# Patient Record
Sex: FEMALE | Race: WHITE | Employment: UNEMPLOYED | URBAN - METROPOLITAN AREA
[De-identification: names, ages, dates, MRNs, and addresses within clinical notes are randomized per-mention and may not be internally consistent; named-entity substitution may affect disease eponyms.]

---

## 2017-01-30 ENCOUNTER — OFFICE VISIT (OUTPATIENT)
Dept: OBGYN | Age: 57
End: 2017-01-30

## 2017-01-30 VITALS — WEIGHT: 135 LBS | DIASTOLIC BLOOD PRESSURE: 74 MMHG | SYSTOLIC BLOOD PRESSURE: 122 MMHG | BODY MASS INDEX: 23.91 KG/M2

## 2017-01-30 DIAGNOSIS — Z12.31 SCREENING MAMMOGRAM, ENCOUNTER FOR: ICD-10-CM

## 2017-01-30 DIAGNOSIS — Z01.419 WELL WOMAN EXAM WITH ROUTINE GYNECOLOGICAL EXAM: Primary | ICD-10-CM

## 2017-01-30 DIAGNOSIS — Z11.51 SPECIAL SCREENING EXAMINATION FOR HUMAN PAPILLOMAVIRUS (HPV): ICD-10-CM

## 2017-01-30 LAB — PAP SMEAR: NORMAL

## 2017-01-30 PROCEDURE — 99396 PREV VISIT EST AGE 40-64: CPT | Performed by: NURSE PRACTITIONER

## 2017-02-09 DIAGNOSIS — Z11.51 SPECIAL SCREENING EXAMINATION FOR HUMAN PAPILLOMAVIRUS (HPV): ICD-10-CM

## 2017-02-09 DIAGNOSIS — Z01.419 WELL WOMAN EXAM WITH ROUTINE GYNECOLOGICAL EXAM: ICD-10-CM

## 2017-12-05 ENCOUNTER — TELEPHONE (OUTPATIENT)
Dept: OBGYN | Age: 57
End: 2017-12-05

## 2017-12-05 NOTE — TELEPHONE ENCOUNTER
(ACTIVE) Mammogram order in pt chart, dated 1.30.2017. EXP: 3.30.2018.  Please have pt call [phone]  To schedule mammogram.

## 2017-12-13 ENCOUNTER — HOSPITAL ENCOUNTER (OUTPATIENT)
Dept: WOMENS IMAGING | Age: 57
Discharge: HOME | End: 2017-12-13
Payer: COMMERCIAL

## 2017-12-13 DIAGNOSIS — Z12.31 ENCOUNTER FOR SCREENING MAMMOGRAM FOR BREAST CANCER: ICD-10-CM

## 2017-12-13 PROCEDURE — G0202 SCR MAMMO BI INCL CAD: HCPCS

## 2017-12-19 ENCOUNTER — OFFICE VISIT (OUTPATIENT)
Dept: OBGYN | Age: 57
End: 2017-12-19

## 2017-12-19 VITALS
BODY MASS INDEX: 24.84 KG/M2 | WEIGHT: 140.2 LBS | HEIGHT: 63 IN | SYSTOLIC BLOOD PRESSURE: 110 MMHG | DIASTOLIC BLOOD PRESSURE: 70 MMHG

## 2017-12-19 DIAGNOSIS — N89.8 VAGINAL CYST: Primary | ICD-10-CM

## 2017-12-19 DIAGNOSIS — L72.0 INCLUSION CYST: ICD-10-CM

## 2017-12-19 PROCEDURE — 10060 I&D ABSCESS SIMPLE/SINGLE: CPT | Performed by: OBSTETRICS & GYNECOLOGY

## 2017-12-19 PROCEDURE — G8420 CALC BMI NORM PARAMETERS: HCPCS | Performed by: OBSTETRICS & GYNECOLOGY

## 2017-12-19 PROCEDURE — 1036F TOBACCO NON-USER: CPT | Performed by: OBSTETRICS & GYNECOLOGY

## 2017-12-19 PROCEDURE — 3017F COLORECTAL CA SCREEN DOC REV: CPT | Performed by: OBSTETRICS & GYNECOLOGY

## 2017-12-19 PROCEDURE — 3014F SCREEN MAMMO DOC REV: CPT | Performed by: OBSTETRICS & GYNECOLOGY

## 2017-12-19 PROCEDURE — G8484 FLU IMMUNIZE NO ADMIN: HCPCS | Performed by: OBSTETRICS & GYNECOLOGY

## 2017-12-19 PROCEDURE — 99213 OFFICE O/P EST LOW 20 MIN: CPT | Performed by: OBSTETRICS & GYNECOLOGY

## 2017-12-19 PROCEDURE — G8427 DOCREV CUR MEDS BY ELIG CLIN: HCPCS | Performed by: OBSTETRICS & GYNECOLOGY

## 2017-12-19 RX ORDER — ESTRADIOL 1 MG/1
TABLET ORAL
Refills: 1 | COMMUNITY
Start: 2017-12-08 | End: 2021-07-29 | Stop reason: SDUPTHER

## 2017-12-19 NOTE — PROGRESS NOTES
SUBJECTIVE:   59 y.o..  female complains of painful small cysts in the vaginal area, pt has had these cysts in the past. She states she has them on both sides of her vagina. Review of Systems:  General ROS: negative  Psychological ROS: negative  ENT ROS: negative  Endocrine ROS: negative  Respiratory ROS: no cough, shortness of breath, or wheezing  Cardiovascular ROS: no chest pain or dyspnea on exertion  Gastrointestinal ROS: no abdominal pain, change in bowel habits, or black or bloody stools  Genito-Urinary ROS: no dysuria, trouble voiding, or hematuria  Musculoskeletal ROS: negative  Neurological ROS: no TIA or stroke symptoms  Dermatological ROS: negative      OBJECTIVE:   /70   Ht 5' 3\" (1.6 m)   Wt 140 lb 3.2 oz (63.6 kg)   BMI 24.84 kg/m²     Past Medical History:   Diagnosis Date    Cancer (HCC)     Chronic back pain     Fibromyalgia     Headache(784.0)     Hypothyroid     Hypothyroidism     Melanoma ([facility] [address]     Thyroid disease                                                                    Past Surgical History:   Procedure Laterality Date    APPENDECTOMY      CARPAL TUNNEL RELEASE      FOOT SURGERY      x3    MOHS SURGERY Right      Family History   Problem Relation Age of Onset    Lung Cancer Father     Cervical Cancer Mother     Cancer Father      Social History     Social History    Marital status:      Spouse name: N/A    Number of children: N/A    Years of education: N/A     Occupational History    Not on file.      Social History Main Topics    Smoking status: Never Smoker    Smokeless tobacco: Never Used    Alcohol use 0.0 oz/week     3 Glasses of wine, 3 Cans of beer per week      Comment: every week    Drug use: No    Sexual activity: Not Currently     Other Topics Concern    Not on file     Social History Narrative    ** Merged History Encounter **              Physical Exam:  CONSTITUTIONAL: She appears well nourished and developed margins identified. Hemostasis was obtained with local pressure and pressure dressing was applied. The patient tolerated the procedure. COMPLICATIONS: No immediate complication. Return to office if sx worsen or fail to improve. Pt advised that she can do sitz baths with epsom salts for discomfort. I, Kelsey Herrera, am scribing for, and in the presence of, Dr Sameer Castillo. Electronically signed by: Kelsey Herrera 12/19/17 9:04 AM      I, Dr Sameer Castillo, personally performed the services described in this documentation, as scribed by Kelsey Herrera in my presence, and it is both accurate and complete.  Electronically signed by: Sameer Castillo MD 12/19/17 11:51 AM

## 2019-01-23 ENCOUNTER — HOSPITAL ENCOUNTER (OUTPATIENT)
Dept: WOMENS IMAGING | Age: 59
Discharge: HOME | End: 2019-01-25
Payer: COMMERCIAL

## 2019-01-23 DIAGNOSIS — Z12.31 ENCOUNTER FOR SCREENING MAMMOGRAM FOR BREAST CANCER: ICD-10-CM

## 2019-01-23 PROCEDURE — 77067 SCR MAMMO BI INCL CAD: CPT

## 2020-08-19 ENCOUNTER — TELEPHONE (OUTPATIENT)
Dept: OBGYN CLINIC | Age: 60
End: 2020-08-19

## 2020-09-02 NOTE — TELEPHONE ENCOUNTER
Please call patient and make aware that she needs to schedule a well woman exam. Pt last ov was with Dr Estela De Santiago 12-19-17.  Once scheduled, mammogram will be ordered at time of annual.

## 2020-09-16 ENCOUNTER — OFFICE VISIT (OUTPATIENT)
Dept: OBGYN CLINIC | Age: 60
End: 2020-09-16
Payer: COMMERCIAL

## 2020-09-16 VITALS
WEIGHT: 149 LBS | HEIGHT: 64 IN | SYSTOLIC BLOOD PRESSURE: 122 MMHG | DIASTOLIC BLOOD PRESSURE: 78 MMHG | BODY MASS INDEX: 25.44 KG/M2

## 2020-09-16 PROCEDURE — 99213 OFFICE O/P EST LOW 20 MIN: CPT | Performed by: OBSTETRICS & GYNECOLOGY

## 2020-09-16 PROCEDURE — 99396 PREV VISIT EST AGE 40-64: CPT | Performed by: OBSTETRICS & GYNECOLOGY

## 2020-09-16 ASSESSMENT — PATIENT HEALTH QUESTIONNAIRE - PHQ9
4. FEELING TIRED OR HAVING LITTLE ENERGY: 1
SUM OF ALL RESPONSES TO PHQ QUESTIONS 1-9: 9
SUM OF ALL RESPONSES TO PHQ QUESTIONS 1-9: 9
5. POOR APPETITE OR OVEREATING: 0
9. THOUGHTS THAT YOU WOULD BE BETTER OFF DEAD, OR OF HURTING YOURSELF: 0
1. LITTLE INTEREST OR PLEASURE IN DOING THINGS: 2
8. MOVING OR SPEAKING SO SLOWLY THAT OTHER PEOPLE COULD HAVE NOTICED. OR THE OPPOSITE, BEING SO FIGETY OR RESTLESS THAT YOU HAVE BEEN MOVING AROUND A LOT MORE THAN USUAL: 0
7. TROUBLE CONCENTRATING ON THINGS, SUCH AS READING THE NEWSPAPER OR WATCHING TELEVISION: 1
3. TROUBLE FALLING OR STAYING ASLEEP: 1
2. FEELING DOWN, DEPRESSED OR HOPELESS: 3
10. IF YOU CHECKED OFF ANY PROBLEMS, HOW DIFFICULT HAVE THESE PROBLEMS MADE IT FOR YOU TO DO YOUR WORK, TAKE CARE OF THINGS AT HOME, OR GET ALONG WITH OTHER PEOPLE: 2
6. FEELING BAD ABOUT YOURSELF - OR THAT YOU ARE A FAILURE OR HAVE LET YOURSELF OR YOUR FAMILY DOWN: 1
SUM OF ALL RESPONSES TO PHQ9 QUESTIONS 1 & 2: 5

## 2020-09-16 ASSESSMENT — ENCOUNTER SYMPTOMS
VOMITING: 0
EYES NEGATIVE: 1
ANAL BLEEDING: 0
CONSTIPATION: 0
RESPIRATORY NEGATIVE: 1
NAUSEA: 0
DIARRHEA: 0
ABDOMINAL DISTENTION: 0
ABDOMINAL PAIN: 0
ALLERGIC/IMMUNOLOGIC NEGATIVE: 1
RECTAL PAIN: 0
BLOOD IN STOOL: 0

## 2020-09-16 ASSESSMENT — VISUAL ACUITY: OU: 1

## 2020-09-16 NOTE — PROGRESS NOTES
Subjective:      Patient ID: Radha Prince is a 56 y.o. female    Annual exam.  No PMB. . Pap performed and screening mammogram ordered. Monthly SBE encouraged. Screening colonoscopy recommended per routine. F/U annual exam or prn. Pt also wished to discuss small, firm bumps right labia  extending her appointment time by 15 minutes. Denies new sexual partners or abnormal discharge. Exam with 2-3 small inclusion / sebaceous cysts. Discussed warm epsom salt baths and compresses prn. No shaving. Reassured benign process. Also discussed current long term biosynthetic HRT. Sister , also on HRT, recently dx with breast CA. Discussed risks and benefits of HRT and strongly encouraged patient to discontinue HRT. States she has insomnia without HRT. Discussed discussing meds for insomnia with PCP if main sx off HRT. All questions answered. F/U as directed. Vitals:  /78   Ht 5' 3.75\" (1.619 m)   Wt 149 lb (67.6 kg)   BMI 25.78 kg/m²   Past Medical History:   Diagnosis Date    Abnormal Pap smear of cervix     Cancer (HCC)     Melanoma    Chronic back pain     Fibromyalgia     Headache(784.0)     Hypothyroid     Hypothyroidism     [facility]anoma ([facility] [address]     Thyroid disease      Past Surgical History:   Procedure Laterality Date    APPENDECTOMY      CARPAL TUNNEL RELEASE      FOOT SURGERY      x3    MOHS SURGERY Right      Allergies:  Penicillins and Penicillins  Current Outpatient Medications   Medication Sig Dispense Refill    estradiol (ESTRACE) 1 MG tablet TAKE ONE-HALF TABLET BY MOUTH ONCE DAILY  1    progesterone (PROMETRIUM) 100 MG capsule TAKE ONE CAPSULE BY MOUTH DAILY AT BEDTIME  1     No current facility-administered medications for this visit.       Social History     Socioeconomic History    Marital status:      Spouse name: Not on file    Number of children: Not on file    Years of education: Not on file    Highest education level: Not on file   Occupational History  Not on file   Social Needs    Financial resource strain: Not on file    Food insecurity     Worry: Not on file     Inability: Not on file    Transportation needs     Medical: Not on file     Non-medical: Not on file   Tobacco Use    Smoking status: Never Smoker    Smokeless tobacco: Never Used   Substance and Sexual Activity    Alcohol use: Yes     Alcohol/week: 0.0 standard drinks     Types: 3 Glasses of wine, 3 Cans of beer per week     Comment: every week    Drug use: No    Sexual activity: Not Currently   Lifestyle    Physical activity     Days per week: Not on file     Minutes per session: Not on file    Stress: Not on file   Relationships    Social connections     Talks on phone: Not on file     Gets together: Not on file     Attends Taoist service: Not on file     Active member of club or organization: Not on file     Attends meetings of clubs or organizations: Not on file     Relationship status: Not on file    Intimate partner violence     Fear of current or ex partner: Not on file     Emotionally abused: Not on file     Physically abused: Not on file     Forced sexual activity: Not on file   Other Topics Concern    Not on file   Social History Narrative    ** Merged History Encounter **          Family History   Problem Relation Age of Onset    Lung Cancer Father     Cancer Father     Cervical Cancer Mother     Breast Cancer Sister        Review of Systems   Constitutional: Negative. Negative for activity change, appetite change, chills, diaphoresis, fatigue, fever and unexpected weight change. HENT: Negative. Eyes: Negative. Respiratory: Negative. Cardiovascular: Negative. Gastrointestinal: Negative for abdominal distention, abdominal pain, anal bleeding, blood in stool, constipation, diarrhea, nausea, rectal pain and vomiting. Endocrine: Negative.     Genitourinary: Negative for decreased urine volume, difficulty urinating, dyspareunia, dysuria, enuresis, flank pain, frequency, genital sores, hematuria, menstrual problem, pelvic pain, urgency, vaginal bleeding, vaginal discharge and vaginal pain. Musculoskeletal: Negative. Skin: Negative. Allergic/Immunologic: Negative. Neurological: Negative. Hematological: Negative. Psychiatric/Behavioral: Negative. Objective:     Physical Exam  Constitutional:       Appearance: She is well-developed. HENT:      Head: Normocephalic. Eyes:      General: Lids are normal. Vision grossly intact. Neck:      Musculoskeletal: Normal range of motion and neck supple. Thyroid: No thyromegaly. Cardiovascular:      Rate and Rhythm: Normal rate and regular rhythm. Heart sounds: Normal heart sounds. Pulmonary:      Effort: Pulmonary effort is normal. No respiratory distress. Breath sounds: Normal breath sounds. No wheezing or rales. Chest:      Chest wall: No tenderness. Breasts:         Right: Normal. No swelling, bleeding, inverted nipple, mass, nipple discharge, skin change or tenderness. Left: Normal. No swelling, bleeding, inverted nipple, mass, nipple discharge, skin change or tenderness. Abdominal:      General: There is no distension. Palpations: Abdomen is soft. There is no mass. Tenderness: There is no abdominal tenderness. There is no guarding or rebound. Hernia: No hernia is present. There is no hernia in the left inguinal area or right inguinal area. Genitourinary:     General: Normal vulva. Pubic Area: No rash. Labia:         Right: No rash, tenderness, lesion or injury. Left: No rash, tenderness, lesion or injury. Urethra: No prolapse, urethral swelling or urethral lesion. Vagina: Normal. No signs of injury and foreign body. No vaginal discharge, erythema, tenderness or bleeding. Cervix: No cervical motion tenderness, discharge or friability. Uterus: Not deviated, not enlarged, not fixed and not tender. Adnexa:         Right: No mass, tenderness or fullness. Left: No mass, tenderness or fullness. Rectum: Normal.       Musculoskeletal: Normal range of motion. General: No tenderness. Lymphadenopathy:      Cervical: No cervical adenopathy. Upper Body:      Right upper body: No supraclavicular or axillary adenopathy. Left upper body: No supraclavicular or axillary adenopathy. Lower Body: No right inguinal adenopathy. No left inguinal adenopathy. Skin:     General: Skin is warm and dry. Coloration: Skin is not pale. Findings: No erythema or rash. Neurological:      Mental Status: She is alert and oriented to person, place, and time. Psychiatric:         Behavior: Behavior normal.         Thought Content: Thought content normal.         Judgment: Judgment normal.         Assessment:       Diagnosis Orders   1. Women's annual routine gynecological examination  PAP SMEAR   2. Screening for human papillomavirus  PAP SMEAR   3. Screening mammogram, encounter for  MARÍA DIGITAL SCREEN W OR WO CAD BILATERAL   4. Osteoporosis screening  DEXA BONE DENSITY AXIAL SKELETON   5. Postmenopausal  DEXA BONE DENSITY AXIAL SKELETON   6. Epidermal cyst of vulva          Plan:      Medications placedthis encounter:  No orders of the defined types were placed in this encounter. Orders placedthis encounter:  Orders Placed This Encounter   Procedures    MARÍA DIGITAL SCREEN W OR WO CAD BILATERAL     Standing Status:   Future     Standing Expiration Date:   9/16/2021    DEXA BONE DENSITY AXIAL SKELETON     Standing Status:   Future     Standing Expiration Date:   9/16/2021    PAP SMEAR     Standing Status:   Future     Standing Expiration Date:   9/16/2021     Order Specific Question:   Collection Type     Answer:    Thin Prep     Order Specific Question:   Prior Abnormal Pap Test     Answer:   No     Order Specific Question:   Screening or Diagnostic     Answer:   Screening Order Specific Question:   HPV Requested? Answer:   Yes     Order Specific Question:   High Risk Patient     Answer:   N/A         Follow up:  Return in about 1 year (around 9/16/2021) for Annual.   Repeat Annual GYN exam every 1 year. Cervical Cytology exam starts age 24. If Negative Cytology;  follow-up screening per current guidelines. Mammograms yearly starting at age 36. Calcium and Vitamin D dosing reviewed ( age appropriate ). Colonoscopy and bone density screening discussed ( age appropriate ). Birth control and STD prevention discussed ( age appropriate ). Gardisil counseling completed for all patients 7-33 yo. Routine health maintenance ( per PCP and guidelines ). The patient was asked if she would like a chaperone present for her intimate exam. She  Declined the chaperone.  Hans Ramos

## 2020-09-23 ENCOUNTER — HOSPITAL ENCOUNTER (OUTPATIENT)
Dept: WOMENS IMAGING | Age: 60
Discharge: HOME | End: 2020-09-25
Payer: COMMERCIAL

## 2020-09-23 PROCEDURE — 77063 BREAST TOMOSYNTHESIS BI: CPT

## 2020-09-30 NOTE — LETTER
[facility]  [address] [facility] [address]  Phone: [phone]  Fax: [phone]    Corbin Dent MD        September 30, 2020    Otilio Reyes[address]ad[address]  [address] [place] [address]      Dear Maisha Calvillo: The results of your most recent Pap smear are normal. This means that no cancerous or precancerous cells were seen. We recommend that you come back in 1 year for your next routine Pap smear. If you have any questions or concerns, please don't hesitate to call.     Sincerely,        Corbin Dent MD

## 2021-05-05 ENCOUNTER — OFFICE VISIT (OUTPATIENT)
Dept: PRIMARY CARE CLINIC | Age: 61
End: 2021-05-05
Payer: COMMERCIAL

## 2021-05-05 VITALS
RESPIRATION RATE: 18 BRPM | WEIGHT: 144.6 LBS | SYSTOLIC BLOOD PRESSURE: 122 MMHG | HEIGHT: 63 IN | HEART RATE: 90 BPM | DIASTOLIC BLOOD PRESSURE: 74 MMHG | BODY MASS INDEX: 25.62 KG/M2 | OXYGEN SATURATION: 99 %

## 2021-05-05 DIAGNOSIS — L57.0 ACTINIC KERATOSIS: ICD-10-CM

## 2021-05-05 DIAGNOSIS — Z13.220 SCREENING FOR HYPERLIPIDEMIA: ICD-10-CM

## 2021-05-05 DIAGNOSIS — Z00.00 PREVENTATIVE HEALTH CARE: ICD-10-CM

## 2021-05-05 DIAGNOSIS — I87.2 STASIS DERMATITIS OF BOTH LEGS: ICD-10-CM

## 2021-05-05 DIAGNOSIS — D22.9 NEVUS: Primary | ICD-10-CM

## 2021-05-05 DIAGNOSIS — Z12.11 ENCOUNTER FOR SCREENING COLONOSCOPY: ICD-10-CM

## 2021-05-05 PROCEDURE — G8427 DOCREV CUR MEDS BY ELIG CLIN: HCPCS | Performed by: INTERNAL MEDICINE

## 2021-05-05 PROCEDURE — 99204 OFFICE O/P NEW MOD 45 MIN: CPT | Performed by: INTERNAL MEDICINE

## 2021-05-05 PROCEDURE — 3017F COLORECTAL CA SCREEN DOC REV: CPT | Performed by: INTERNAL MEDICINE

## 2021-05-05 PROCEDURE — 1036F TOBACCO NON-USER: CPT | Performed by: INTERNAL MEDICINE

## 2021-05-05 PROCEDURE — G8419 CALC BMI OUT NRM PARAM NOF/U: HCPCS | Performed by: INTERNAL MEDICINE

## 2021-05-05 SDOH — ECONOMIC STABILITY: TRANSPORTATION INSECURITY
IN THE PAST 12 MONTHS, HAS THE LACK OF TRANSPORTATION KEPT YOU FROM MEDICAL APPOINTMENTS OR FROM GETTING MEDICATIONS?: NO

## 2021-05-05 SDOH — ECONOMIC STABILITY: FOOD INSECURITY: WITHIN THE PAST 12 MONTHS, YOU WORRIED THAT YOUR FOOD WOULD RUN OUT BEFORE YOU GOT MONEY TO BUY MORE.: NEVER TRUE

## 2021-05-05 SDOH — ECONOMIC STABILITY: FOOD INSECURITY: WITHIN THE PAST 12 MONTHS, THE FOOD YOU BOUGHT JUST DIDN'T LAST AND YOU DIDN'T HAVE MONEY TO GET MORE.: NEVER TRUE

## 2021-05-05 ASSESSMENT — ENCOUNTER SYMPTOMS
NAUSEA: 0
WHEEZING: 0
SINUS PRESSURE: 0
RHINORRHEA: 0
DIARRHEA: 0
SORE THROAT: 0
SINUS PAIN: 0
COUGH: 0
SHORTNESS OF BREATH: 0
VOMITING: 0
ABDOMINAL PAIN: 0

## 2021-05-05 ASSESSMENT — PATIENT HEALTH QUESTIONNAIRE - PHQ9: SUM OF ALL RESPONSES TO PHQ QUESTIONS 1-9: 2

## 2021-05-05 NOTE — PROGRESS NOTES
Subjective:      Patient ID: César Willis is a 59 y.o. female  New patient   HPI  Patient presents to establish care with PCP today. Previous PCP was Dr. Belkys Cox    PMHx: None  Last pap test: negative in 2020       Last colonoscopy: Cologuard negative     Last mammogram: benign in 2020   Last tetanus shot: declines        Hx zoster vaccination? declines   10-year ASCVD risk: no lipid panel     CC: skin mole and rash x 6 months  Pt noticed a mole on her right calf and a scaly rash on her left shin about 6 months ago. Right calf mole has changed color and size. Left shin remains scaly despite scratching. Hx melanoma, SCC and BCC. Pt also complains of longterm leg skin discoloration, swelling, no pain. Attests to previosu vein treatments. Past Medical History:   Diagnosis Date    Abnormal Pap smear of cervix     Cancer (HCC)     Melanoma    Chronic back pain     Fibromyalgia     Headache(784.0)     Hypothyroid     Hypothyroidism     Melanoma ([facility] [address]     Thyroid disease      Past Surgical History:   Procedure Laterality Date    APPENDECTOMY      CARPAL TUNNEL RELEASE      FOOT SURGERY      x3    MOHS SURGERY Right      Social History     Socioeconomic History    Marital status:      Spouse name: Not on file    Number of children: Not on file    Years of education: Not on file    Highest education level: Not on file   Occupational History    Not on file   Social Needs    Financial resource strain: Not very hard    Food insecurity     Worry: Never true     Inability: Never true    Transportation needs     Medical: No     Non-medical: No   Tobacco Use    Smoking status: Never Smoker    Smokeless tobacco: Never Used   Substance and Sexual Activity    Alcohol use:  Yes     Alcohol/week: 0.0 standard drinks     Types: 3 Glasses of wine, 3 Cans of beer per week     Comment: every week    Drug use: No    Sexual activity: Not Currently   Lifestyle    Physical activity     Days per week: Not on file     Minutes per session: Not on file    Stress: Not on file   Relationships    Social connections     Talks on phone: Not on file     Gets together: Not on file     Attends Islam service: Not on file     Active member of club or organization: Not on file     Attends meetings of clubs or organizations: Not on file     Relationship status: Not on file    Intimate partner violence     Fear of current or ex partner: Not on file     Emotionally abused: Not on file     Physically abused: Not on file     Forced sexual activity: Not on file   Other Topics Concern    Not on file   Social History Narrative    ** Merged History Encounter **          Family History   Problem Relation Age of Onset    Lung Cancer Father     Cancer Father     Cervical Cancer Mother     Breast Cancer Sister      Allergies:  Penicillins and Penicillins  There is no problem list on file for this patient. Current Outpatient Medications on File Prior to Visit   Medication Sig Dispense Refill    estradiol (ESTRACE) 1 MG tablet TAKE ONE-HALF TABLET BY MOUTH ONCE DAILY  1    progesterone (PROMETRIUM) 100 MG capsule TAKE ONE CAPSULE BY MOUTH DAILY AT BEDTIME  1     No current facility-administered medications on file prior to visit. Review of Systems   Constitutional: Negative for chills, diaphoresis, fatigue and fever. HENT: Negative for congestion, ear discharge, ear pain, rhinorrhea, sinus pressure, sinus pain, sneezing and sore throat. Respiratory: Negative for cough, shortness of breath and wheezing. Cardiovascular: Negative for chest pain. Gastrointestinal: Negative for abdominal pain, diarrhea, nausea and vomiting. Endocrine: Negative for cold intolerance and heat intolerance. Genitourinary: Negative for dysuria and frequency. Neurological: Negative for dizziness and light-headedness.      Objective:   /74 (Site: Left Upper Arm, Position: Sitting, Cuff Size: Medium Adult)   Pulse 90   Resp 18 Ht 5' 3\" (1.6 m)   Wt 144 lb 9.6 oz (65.6 kg)   SpO2 99%   BMI 25.61 kg/m²     Physical Exam  Constitutional:       General: She is not in acute distress. Appearance: She is not diaphoretic. Cardiovascular:      Rate and Rhythm: Normal rate and regular rhythm. Pulses: Normal pulses. Heart sounds: Normal heart sounds, S1 normal and S2 normal.   Pulmonary:      Effort: Pulmonary effort is normal. No respiratory distress. Breath sounds: Normal breath sounds. No wheezing or rales. Chest:      Chest wall: No tenderness. Abdominal:      General: Bowel sounds are normal.      Tenderness: There is no abdominal tenderness. Musculoskeletal:         General: No tenderness. Right lower leg: No edema. Left lower leg: No edema. Comments: B/l LE hyperpigmented macules and spider veins    Left anterior leg scaly patch and right calf hyperpigmented nodular scab   Neurological:      General: No focal deficit present. Mental Status: She is alert. Cranial Nerves: No cranial nerve deficit. Assessment:       Diagnosis Orders   1. Nevus     2. Actinic keratosis     3. Stasis dermatitis of both legs     4. Screening for hyperlipidemia  Lipid, Fasting   5. Preventative health care  CBC With Auto Differential    HIV Screen    Comprehensive Metabolic Panel    Hepatitis C Antibody    Hemoglobin A1C    TSH with Reflex    Vitamin D 25 Hydroxy   6.  Encounter for screening colonoscopy        Plan:      Orders Placed This Encounter   Procedures    Lipid, Fasting     Standing Status:   Future     Standing Expiration Date:   5/5/2022    CBC With Auto Differential     Standing Status:   Future     Standing Expiration Date:   5/5/2022    HIV Screen     Standing Status:   Future     Standing Expiration Date:   5/5/2022    Comprehensive Metabolic Panel     Standing Status:   Future     Standing Expiration Date:   5/5/2022    Hepatitis C Antibody     Standing Status:   Future     Standing Expiration Date:   5/5/2022    Hemoglobin A1C     Standing Status:   Future     Standing Expiration Date:   5/5/2022    TSH with Reflex     Standing Status:   Future     Standing Expiration Date:   5/5/2022    Vitamin D 25 Hydroxy     Standing Status:   Future     Standing Expiration Date:   5/5/2022     No orders of the defined types were placed in this encounter. Based on hx skin cancer and current actinic keratosis, pt offered derm referral. Hesitation expressed due to financial and insurance constraint. She will return for nevus removal but will locate insurance-approved derm for actinic keratosis. Return in about 1 week (around 5/12/2021) for skin procedure.

## 2021-05-13 ENCOUNTER — TELEPHONE (OUTPATIENT)
Dept: OBGYN CLINIC | Age: 61
End: 2021-05-13

## 2021-05-13 DIAGNOSIS — N90.7 EPIDERMAL CYST OF VULVA: Primary | ICD-10-CM

## 2021-05-13 NOTE — TELEPHONE ENCOUNTER
Pt called to say that she when she last saw Dr Taiwo Marie she had some bumps by her vaginal area. She wanted them removed and Dr would not remove them she said because she told her they would just come back. She says that she has gotten more, on both sides now. One side more than the other. They are thick and hard -larger than a pinhead, but not as big as an eraser head. They are itchy now and she has tried scratching them with her nails to remove them and they won't come off. She feels them when she wipes and showers and wants them gone. She said that Dr Anjel Cisneros used to remove them in the office, and told her that they were a build up of progesterone, but Dr Taiwo Marie didn't seem to think that was the case, she she told her that the last time. She has, also, started a new relationship and is very self conscious and wants to look clean, too. She does not have any insurance right now and would like to know if  would be willing to remove them without a consult visit, too? If not, does she know of anyone who she can recommend that will remove them?

## 2021-05-19 ENCOUNTER — TELEPHONE (OUTPATIENT)
Dept: PRIMARY CARE CLINIC | Age: 61
End: 2021-05-19

## 2021-05-19 DIAGNOSIS — I87.2 STASIS DERMATITIS OF BOTH LEGS: Primary | ICD-10-CM

## 2021-05-19 DIAGNOSIS — Z00.00 PREVENTATIVE HEALTH CARE: ICD-10-CM

## 2021-05-19 DIAGNOSIS — Z13.220 SCREENING FOR HYPERLIPIDEMIA: ICD-10-CM

## 2021-05-19 LAB
ALBUMIN SERPL-MCNC: 4.7 G/DL (ref 3.5–4.6)
ALP BLD-CCNC: 76 U/L (ref 40–130)
ALT SERPL-CCNC: 13 U/L (ref 0–33)
ANION GAP SERPL CALCULATED.3IONS-SCNC: 13 MEQ/L (ref 9–15)
AST SERPL-CCNC: 16 U/L (ref 0–35)
BASOPHILS ABSOLUTE: 0.1 K/UL (ref 0–0.2)
BASOPHILS RELATIVE PERCENT: 1.1 %
BILIRUB SERPL-MCNC: 0.4 MG/DL (ref 0.2–0.7)
BUN BLDV-MCNC: 13 MG/DL (ref 8–23)
CALCIUM SERPL-MCNC: 10.1 MG/DL (ref 8.5–9.9)
CHLORIDE BLD-SCNC: 104 MEQ/L (ref 95–107)
CHOLESTEROL, FASTING: 222 MG/DL (ref 0–199)
CO2: 24 MEQ/L (ref 20–31)
CREAT SERPL-MCNC: 0.57 MG/DL (ref 0.5–0.9)
EOSINOPHILS ABSOLUTE: 0.2 K/UL (ref 0–0.7)
EOSINOPHILS RELATIVE PERCENT: 3.2 %
GFR AFRICAN AMERICAN: >60
GFR NON-AFRICAN AMERICAN: >60
GLOBULIN: 2.7 G/DL (ref 2.3–3.5)
GLUCOSE BLD-MCNC: 106 MG/DL (ref 70–99)
HBA1C MFR BLD: 5.7 % (ref 4.8–5.9)
HCT VFR BLD CALC: 42.9 % (ref 37–47)
HDLC SERPL-MCNC: 74 MG/DL (ref 40–59)
HEMOGLOBIN: 14.1 G/DL (ref 12–16)
HEPATITIS C ANTIBODY INTERPRETATION: NORMAL
LDL CHOLESTEROL CALCULATED: 135 MG/DL (ref 0–129)
LYMPHOCYTES ABSOLUTE: 1.2 K/UL (ref 1–4.8)
LYMPHOCYTES RELATIVE PERCENT: 20 %
MCH RBC QN AUTO: 32.4 PG (ref 27–31.3)
MCHC RBC AUTO-ENTMCNC: 32.8 % (ref 33–37)
MCV RBC AUTO: 98.7 FL (ref 82–100)
MONOCYTES ABSOLUTE: 0.3 K/UL (ref 0.2–0.8)
MONOCYTES RELATIVE PERCENT: 5.2 %
NEUTROPHILS ABSOLUTE: 4.3 K/UL (ref 1.4–6.5)
NEUTROPHILS RELATIVE PERCENT: 70.5 %
PDW BLD-RTO: 13.6 % (ref 11.5–14.5)
PLATELET # BLD: 239 K/UL (ref 130–400)
POTASSIUM SERPL-SCNC: 4.8 MEQ/L (ref 3.4–4.9)
RBC # BLD: 4.35 M/UL (ref 4.2–5.4)
SODIUM BLD-SCNC: 141 MEQ/L (ref 135–144)
TOTAL PROTEIN: 7.4 G/DL (ref 6.3–8)
TRIGLYCERIDE, FASTING: 63 MG/DL (ref 0–150)
TSH REFLEX: 2.83 UIU/ML (ref 0.44–3.86)
VITAMIN D 25-HYDROXY: 50.7 NG/ML (ref 30–100)
WBC # BLD: 6.2 K/UL (ref 4.8–10.8)

## 2021-05-19 NOTE — TELEPHONE ENCOUNTER
Pt states you knew of a dermatologist in [facility] that can do laser treatment for discoloration on legs. Would like that info if possible.

## 2021-05-19 NOTE — TELEPHONE ENCOUNTER
I placed referral to Dr. Kathya Myles ([phone].  Her old address in [address] [place] [address] is still there but she is in [address] [place] [address]

## 2021-05-20 LAB — HIV AG/AB: NONREACTIVE

## 2021-05-20 NOTE — TELEPHONE ENCOUNTER
Pt aware. She is asking now if you would be able to check and monitor hormone levels: estrogen, progesterone and testosterone    If so, she does not want these labs added to what she just had done.  She needs to check with her insurance first.

## 2021-05-20 NOTE — TELEPHONE ENCOUNTER
Does she have any symptoms? I wonder why she asked about checking estrogen and testosterone levels.     If no symptoms, then no need

## 2021-05-27 DIAGNOSIS — E78.5 HYPERLIPIDEMIA, UNSPECIFIED HYPERLIPIDEMIA TYPE: Primary | ICD-10-CM

## 2021-07-08 ENCOUNTER — TELEPHONE (OUTPATIENT)
Dept: PRIMARY CARE CLINIC | Age: 61
End: 2021-07-08

## 2021-07-08 NOTE — TELEPHONE ENCOUNTER
I asked her that and that was to a different physician for a different problem  She still needs this new referral placed

## 2021-07-08 NOTE — TELEPHONE ENCOUNTER
Patient calling in, asking for a referral for her skin cancer annual check up to the dermatologist (Dr Angeles Perla [facility] [address] [facility] [place] Fax is [phone])

## 2021-07-09 DIAGNOSIS — Z00.00 PREVENTATIVE HEALTH CARE: Primary | ICD-10-CM

## 2021-07-23 NOTE — TELEPHONE ENCOUNTER
Pls tell pharmacy this should be sent to her gynecologist:    Juan Melara MD    [address] [facility]    [phone]

## 2021-07-28 ENCOUNTER — TELEPHONE (OUTPATIENT)
Dept: PRIMARY CARE CLINIC | Age: 61
End: 2021-07-28

## 2021-07-29 DIAGNOSIS — Z78.0 POST-MENOPAUSAL: Primary | ICD-10-CM

## 2021-07-29 RX ORDER — ESTRADIOL 1 MG/1
TABLET ORAL
Qty: 21 TABLET | Refills: 0 | Status: SHIPPED | OUTPATIENT
Start: 2021-07-29

## 2021-07-29 RX ORDER — PROGESTERONE 100 MG/1
100 CAPSULE ORAL NIGHTLY
Qty: 20 CAPSULE | Refills: 0 | Status: SHIPPED | OUTPATIENT
Start: 2021-07-29

## 2021-07-29 NOTE — TELEPHONE ENCOUNTER
I gave her 1 refill each  I referred to [facility] [place] for further management

## 2021-09-10 ENCOUNTER — TELEPHONE (OUTPATIENT)
Dept: PRIMARY CARE CLINIC | Age: 61
End: 2021-09-10

## 2021-09-10 DIAGNOSIS — Z12.39 SCREENING BREAST EXAMINATION: Primary | ICD-10-CM

## 2021-10-12 ENCOUNTER — HOSPITAL ENCOUNTER (OUTPATIENT)
Dept: WOMENS IMAGING | Age: 61
Discharge: HOME | End: 2021-10-14
Payer: COMMERCIAL

## 2021-10-12 VITALS — HEIGHT: 64 IN | BODY MASS INDEX: 25.02 KG/M2

## 2021-10-12 DIAGNOSIS — Z12.39 SCREENING BREAST EXAMINATION: ICD-10-CM

## 2021-10-12 PROCEDURE — 77063 BREAST TOMOSYNTHESIS BI: CPT

## 2021-10-16 ENCOUNTER — TELEPHONE (OUTPATIENT)
Dept: PRIMARY CARE CLINIC | Age: 61
End: 2021-10-16

## 2021-11-02 ENCOUNTER — OFFICE VISIT (OUTPATIENT)
Dept: OBGYN CLINIC | Age: 61
End: 2021-11-02
Payer: MEDICAID

## 2021-11-02 VITALS — SYSTOLIC BLOOD PRESSURE: 120 MMHG | DIASTOLIC BLOOD PRESSURE: 68 MMHG

## 2021-11-02 DIAGNOSIS — Z78.0 POSTMENOPAUSAL: ICD-10-CM

## 2021-11-02 DIAGNOSIS — Z01.419 ENCOUNTER FOR WELL WOMAN EXAM WITH ROUTINE GYNECOLOGICAL EXAM: Primary | ICD-10-CM

## 2021-11-02 DIAGNOSIS — Z13.820 OSTEOPOROSIS SCREENING: ICD-10-CM

## 2021-11-02 DIAGNOSIS — Z12.31 SCREENING MAMMOGRAM, ENCOUNTER FOR: ICD-10-CM

## 2021-11-02 PROCEDURE — 99396 PREV VISIT EST AGE 40-64: CPT | Performed by: OBSTETRICS & GYNECOLOGY

## 2021-11-02 PROCEDURE — G8484 FLU IMMUNIZE NO ADMIN: HCPCS | Performed by: OBSTETRICS & GYNECOLOGY

## 2021-11-02 ASSESSMENT — ENCOUNTER SYMPTOMS
CONSTIPATION: 0
RECTAL PAIN: 0
BLOOD IN STOOL: 0
EYES NEGATIVE: 1
VOMITING: 0
ALLERGIC/IMMUNOLOGIC NEGATIVE: 1
RESPIRATORY NEGATIVE: 1
ABDOMINAL PAIN: 0
DIARRHEA: 0
NAUSEA: 0
ABDOMINAL DISTENTION: 0
ANAL BLEEDING: 0

## 2021-11-02 ASSESSMENT — VISUAL ACUITY: OU: 1

## 2021-11-02 NOTE — PROGRESS NOTES
Subjective:      Patient ID: Maisha Prieto is a 59 y.o. female    Annual exam.  No PMB. No GYN complaints. Pap deferred ( negative 2020 )  and screening mammogram ordered. Monthly SBE encouraged. Screening colonoscopy recommended per routine. F/U annual exam or prn. Vitals:  /68   Past Medical History:   Diagnosis Date    Abnormal Pap smear of cervix     Cancer (HCC)     Melanoma    Chronic back pain     Fibromyalgia     Headache(784.0)     Hypothyroid     Hypothyroidism     Melanoma ([address]     Thyroid disease      Past Surgical History:   Procedure Laterality Date    APPENDECTOMY      CARPAL TUNNEL RELEASE      FOOT SURGERY      x3    MOHS SURGERY Right      Allergies:  Penicillins and Penicillins  Current Outpatient Medications   Medication Sig Dispense Refill    estradiol (ESTRACE) 1 MG tablet TAKE ONE-HALF TABLET BY MOUTH ONCE DAILY 21 tablet 0    progesterone (PROMETRIUM) 100 MG CAPS capsule Take 1 capsule by mouth nightly 20 capsule 0     No current facility-administered medications for this visit. Social History     Socioeconomic History    Marital status:      Spouse name: Not on file    Number of children: Not on file    Years of education: Not on file    Highest education level: Not on file   Occupational History    Not on file   Tobacco Use    Smoking status: Never Smoker    Smokeless tobacco: Never Used   Vaping Use    Vaping Use: Never used   Substance and Sexual Activity    Alcohol use:  Yes     Alcohol/week: 0.0 standard drinks     Types: 3 Glasses of wine, 3 Cans of beer per week     Comment: every week    Drug use: No    Sexual activity: Not Currently   Other Topics Concern    Not on file   Social History Narrative    ** Merged History Encounter **          Social Determinants of Health     Financial Resource Strain: Low Risk     Difficulty of Paying Living Expenses: Not very hard   Food Insecurity: No Food Insecurity    Worried About Running Out of Food in the Last Year: Never true    [address] in the Last Year: Never true   Transportation Needs: No Transportation Needs    Lack of Transportation (Medical): No    Lack of Transportation (Non-Medical): No   Physical Activity:     Days of Exercise per Week:     Minutes of Exercise per Session:    Stress:     Feeling of Stress :    Social Connections:     Frequency of Communication with Friends and Family:     Frequency of Social Gatherings with Friends and Family:     Attends Rastafari Services:     Active Member of Clubs or Organizations:     Attends Club or Organization Meetings:     Marital Status:    Intimate Partner Violence:     Fear of Current or Ex-Partner:     Emotionally Abused:     Physically Abused:     Sexually Abused:      Family History   Problem Relation Age of Onset    Lung Cancer Father     Cancer Father     Cervical Cancer Mother     Breast Cancer Sister     Breast Cancer Maternal Aunt     Breast Cancer Maternal Aunt        Review of Systems   Constitutional: Negative. Negative for activity change, appetite change, chills, diaphoresis, fatigue, fever and unexpected weight change. HENT: Negative. Eyes: Negative. Respiratory: Negative. Cardiovascular: Negative. Gastrointestinal: Negative for abdominal distention, abdominal pain, anal bleeding, blood in stool, constipation, diarrhea, nausea, rectal pain and vomiting. Endocrine: Negative. Genitourinary: Negative for decreased urine volume, difficulty urinating, dyspareunia, dysuria, enuresis, flank pain, frequency, genital sores, hematuria, menstrual problem, pelvic pain, urgency, vaginal bleeding, vaginal discharge and vaginal pain. Musculoskeletal: Negative. Skin: Negative. Allergic/Immunologic: Negative. Neurological: Negative. Hematological: Negative. Psychiatric/Behavioral: Negative. Objective:     Physical Exam  Constitutional:       Appearance: She is well-developed. HENT:      Head: Normocephalic. Eyes:      General: Lids are normal. Vision grossly intact. Neck:      Thyroid: No thyromegaly. Cardiovascular:      Rate and Rhythm: Normal rate and regular rhythm. Heart sounds: Normal heart sounds. Pulmonary:      Effort: Pulmonary effort is normal. No respiratory distress. Breath sounds: Normal breath sounds. No wheezing or rales. Chest:      Chest wall: No tenderness. Breasts:         Right: Normal. No swelling, bleeding, inverted nipple, mass, nipple discharge, skin change or tenderness. Left: Normal. No swelling, bleeding, inverted nipple, mass, nipple discharge, skin change or tenderness. Abdominal:      General: There is no distension. Palpations: Abdomen is soft. There is no mass. Tenderness: There is no abdominal tenderness. There is no guarding or rebound. Hernia: No hernia is present. There is no hernia in the left inguinal area or right inguinal area. Genitourinary:     General: Normal vulva. Pubic Area: No rash. Labia:         Right: No rash, tenderness, lesion or injury. Left: No rash, tenderness, lesion or injury. Urethra: No prolapse, urethral swelling or urethral lesion. Vagina: Normal. No signs of injury and foreign body. No vaginal discharge, erythema, tenderness or bleeding. Cervix: No cervical motion tenderness, discharge or friability. Uterus: Not deviated, not enlarged, not fixed and not tender. Adnexa:         Right: No mass, tenderness or fullness. Left: No mass, tenderness or fullness. Rectum: Normal.   Musculoskeletal:         General: No tenderness. Normal range of motion. Cervical back: Normal range of motion and neck supple. Lymphadenopathy:      Cervical: No cervical adenopathy. Upper Body:      Right upper body: No supraclavicular or axillary adenopathy. Left upper body: No supraclavicular or axillary adenopathy. Lower Body: No right inguinal adenopathy. No left inguinal adenopathy. Skin:     General: Skin is warm and dry. Coloration: Skin is not pale. Findings: No erythema or rash. Neurological:      Mental Status: She is alert and oriented to person, place, and time. Psychiatric:         Behavior: Behavior normal.         Thought Content: Thought content normal.         Judgment: Judgment normal.         Assessment:       Diagnosis Orders   1. Encounter for well woman exam with routine gynecological exam     2. Screening mammogram, encounter for  MARÍA DIGITAL SCREEN W OR WO CAD BILATERAL   3. Osteoporosis screening  DEXA BONE DENSITY AXIAL SKELETON   4. Postmenopausal  DEXA BONE DENSITY AXIAL SKELETON        Plan:      Medications placedthis encounter:  No orders of the defined types were placed in this encounter. Orders placedthis encounter:  Orders Placed This Encounter   Procedures    MARÍA DIGITAL SCREEN W OR WO CAD BILATERAL     Standing Status:   Future     Standing Expiration Date:   11/2/2022    DEXA BONE DENSITY AXIAL SKELETON     Standing Status:   Future     Standing Expiration Date:   11/2/2022         Follow up:  Return in about 1 year (around 11/2/2022) for Annual.   Repeat Annual GYN exam every 1 year. Cervical Cytology exam starts age 24. If Negative Cytology;  follow-up screening per current guidelines. Mammograms yearly starting at age 36. Calcium and Vitamin D dosing reviewed ( age appropriate ). Colonoscopy and bone density screening discussed ( age appropriate ). Birth control and STD prevention discussed ( age appropriate ). Gardisil counseling completed for all patients ( age appropriate ). Routine health maintenance ( per PCP and guidelines ).

## 2022-10-20 ENCOUNTER — HOSPITAL ENCOUNTER (OUTPATIENT)
Dept: WOMENS IMAGING | Age: 62
Discharge: HOME | End: 2022-10-22
Payer: MEDICAID

## 2022-10-20 VITALS — HEIGHT: 64 IN | BODY MASS INDEX: 25.02 KG/M2

## 2022-10-20 DIAGNOSIS — Z12.31 VISIT FOR SCREENING MAMMOGRAM: ICD-10-CM

## 2022-10-20 PROCEDURE — 77063 BREAST TOMOSYNTHESIS BI: CPT

## 2023-10-09 ENCOUNTER — HOSPITAL ENCOUNTER (OUTPATIENT)
Dept: NEUROLOGY | Facility: CLINIC | Age: 63
Discharge: HOME | End: 2023-10-09
Payer: COMMERCIAL

## 2023-10-09 ENCOUNTER — OFFICE VISIT (OUTPATIENT)
Dept: NEUROLOGY | Facility: CLINIC | Age: 63
End: 2023-10-09
Payer: COMMERCIAL

## 2023-10-09 DIAGNOSIS — M35.00 HX OF SJOGREN'S DISEASE (MULTI): ICD-10-CM

## 2023-10-09 DIAGNOSIS — G62.89 OTHER POLYNEUROPATHY: ICD-10-CM

## 2023-10-09 DIAGNOSIS — G90.1 DYSAUTONOMIA (MULTI): ICD-10-CM

## 2023-10-09 DIAGNOSIS — G62.9 SMALL FIBER NEUROPATHY: Primary | ICD-10-CM

## 2023-10-09 PROCEDURE — 99215 OFFICE O/P EST HI 40 MIN: CPT | Performed by: SPECIALIST

## 2023-10-09 PROCEDURE — 95924 ANS PARASYMP & SYMP W/TILT: CPT | Performed by: SPECIALIST

## 2023-10-09 PROCEDURE — 95923 AUTONOMIC NRV SYST FUNJ TEST: CPT

## 2023-10-09 PROCEDURE — 95923 AUTONOMIC NRV SYST FUNJ TEST: CPT | Performed by: SPECIALIST

## 2023-10-09 NOTE — PROGRESS NOTES
Chronic progressive pain follow-up sensory loss in a distal to proximal distribution suggestive of sensory polyneuropathy.    History of degenerative disc disease, chronic lower back pain radiating to the lower extremities with superimposed bilateral intermittent leg cramps concerning for lumbar sacral radiculopathy.    Suspect superimposed dysautonomia with and small fiber neuropathy by a history of irritable bowel syndrome, Raynaud's phenomena, palpitations, hands and feet pain.    History of Sjogren syndrome and positive family medical history of rheumatological disorder (by sister and father )     Pauly reports not feeling well since her last visit, having experienced a fall that resulted in a fractured right distal radius while washing her kitchen floor. The fracture did not require surgery and is healing well with a soft cast.     Pauly also experienced a severe rash from the top of her head to her toes, which was attributed to a viral infection. She was bedridden for two weeks, feeling ill, with symptoms resembling the flu, including a low-grade sore throat. She was tested for COVID, two types of flu, and strep, but all tests came back negative. She continues to experience itching and has some scarring on her skin. She was prescribed steroids for the itching.    The patient has a history of Sjogren's syndrome and is concerned about the impact of the condition on her overall health. She has been experiencing reduced energy levels, decreased coordination, and increased pain, which has affected her ability to exercise and maintain muscle strength. Pauly has a labrum tear in her right hip and a gluteus vira tear, which occurred about a year ago. She has also had elevated sedimentation rates in the past, but recent blood work showed normal levels.    - Diagnostic test results:    - X-rays of the cervical and lumbar spine revealed mild to moderate spondylosis, mild bilateral neuroforaminal stenosis at C6-7, moderate  facet disease, and mild spondylosis in the lower lumbar spine.    - Wrist x-ray on September 19, 2023, showed a distal radius fracture with signs of interval healing.    - Autonomic nervous system confirmed small fiber neuropathy.    - Blood work: Not mentioned.    - EMG nerve conduction study of the left arm and leg scheduled for November 16th.    She discontinued vitamin B6 supplements and was not able to see Rheumatologist.        GENERAL APPEARANCE:  No distress, alert and cooperative.     She has skin rash throughout     CRANIAL NERVES:  Cranial nerves were normal.      CN 2- Visual Acuity  OD: 20/20 (corrected)   OS: 20/20 (corrected); visual fields full to confrontation.      CN 3, 4, 6-  Pupils round, 4 mm in diameter, equally reactive to light. No ptosis. EOMs normal alignment, full range of movement, no nystagmus     CN 5- Facial sensation intact bilaterally. Normal corneal reflexes.      CN 7- Normal and symmetric facial strength. Nasolabial folds symmetric.     CN 8- Hearing intact to finger rub, whisper.      CN 9- Palate elevates symmetrically. Normal gag reflex.      CN 11- Normal strength of shoulder shrug and neck turning      CN 12- Tongue midline, with normal bulk and strength; no fasciculations.     MOTOR:  Right forearm in brace . Motor exam was normal. Muscle bulk and tone were normal in both upper and lower extremities. Muscle strength was 5/5 in distal and proximal muscles in both upper and lower extremities. No fasciculations, tremor or other abnormal movements were present.     REFLEXES:  Right/ Left:  Biceps 2/2, brachioradialis 2/2, triceps 2/2, patellar 2/2, ankle 2/2  Babinski: toes downgoing to plantar stimulation. No clonus, frontal release signs or other pathologic reflexes present.     SENSORY: Sensory exam was intact to light touch, sharp/dull, vibration and position sense in both UE and LE.     COORDINATION: MALLIKA were intact in upper and lower extremities.  In UE-  finger-nose-finger was intact and in LE- heel-to-shin was intact without dysmetria or overshoot.      GAIT: Station was stable with a normal base and negative Romberg sign. Gait was stable with a normal arm swing and speed. No ataxia, shuffling, steppage or waddling was noted. Tandem gait was intact. Postural reflexes were normal.     No current outpatient medications on file prior to visit.     No current facility-administered medications on file prior to visit.      Assessment and plan:     1. Small Fiber Neuropathy  as confirmed by autonomic nervous system testing.  2. Rash  3. Cervical and Lumbar Spondylosis ( arthritis)   4. Raynaud's Phenomenon  5. Labrum Tear (Right Hip) and Gluteus Alonzo Tear  - Suspected  autoimmune etiology association with Sjogren's syndrome.       Plan:    - Refer to rheumatologist for evaluation and management of Sjogren's syndrome.    - Modify exercise routine to avoid exacerbating injuries.    - Encourage low-impact exercises and activities to maintain muscle strength.    - Proceed with EMG nerve conduction study of the left arm and leg on November 16th.Follow up in my office after michael          I spent 40 minutes during this interview , including reviewing records, images, and explaining to the patient about her condition.

## 2023-10-09 NOTE — PATIENT INSTRUCTIONS
Subject: Follow-up Instructions and Recommendations    Dear Pauly,    Thank you for your recent visit to our clinic. I appreciate your proactive approach to addressing your health concerns. It was a pleasure meeting with you and discussing your symptoms and medical history. Based on our conversation, I have outlined the following instructions and recommendations for your reference:    You have   1. Small Fiber Neuropathy  as confirmed by autonomic nervous system testing.  2. Rash  3. Cervical and Lumbar Spondylosis ( arthritis)   4. Raynaud's Phenomenon  5. Labrum Tear (Right Hip) and Gluteus Alonzo Tear    I highly suspected  autoimmune etiology association with Sjogren's Syndrome.       Plan:     - Refer to rheumatologist for evaluation and management of Sjogren's syndrome.    - Modify exercise routine to avoid exacerbating injuries.    - Encourage low-impact exercises and activities to maintain muscle strength.    - Proceed with EMG nerve conduction study of the left arm and leg on November 16th.Follow up in my office after michael    - Due to inflammation, it is best to avoid chiropractic manipulations of the spine.       Please feel free to reach out to our office if you have any further questions or concerns.   Wishing you good health and a speedy recovery.    Sincerely,    Dr. Arnel Major MD  Neurology Specialist

## 2023-10-14 PROBLEM — M25.551 PAIN IN RIGHT HIP: Status: ACTIVE | Noted: 2023-01-03

## 2023-10-14 PROBLEM — G90.1 DYSAUTONOMIA (MULTI): Status: ACTIVE | Noted: 2023-10-14

## 2023-10-14 PROBLEM — S52.509A DISTAL RADIUS FRACTURE: Status: ACTIVE | Noted: 2023-10-14

## 2023-10-14 PROBLEM — M35.00 SJOGREN'S DISEASE (MULTI): Status: ACTIVE | Noted: 2022-08-04

## 2023-10-14 PROBLEM — G62.9 NEUROPATHY: Status: ACTIVE | Noted: 2023-10-14

## 2023-10-14 PROBLEM — Z85.820 HISTORY OF MELANOMA: Status: ACTIVE | Noted: 2023-03-03

## 2023-10-14 PROBLEM — C44.91 BASAL CELL CARCINOMA: Status: ACTIVE | Noted: 2022-08-04

## 2023-10-14 PROBLEM — M54.50 LOW BACK PAIN: Status: ACTIVE | Noted: 2023-10-14

## 2023-10-14 PROBLEM — G43.909 MIGRAINES: Status: ACTIVE | Noted: 2022-08-04

## 2023-10-14 RX ORDER — TRAMADOL HYDROCHLORIDE 50 MG/1
50 TABLET ORAL EVERY 12 HOURS
COMMUNITY
Start: 2023-09-06

## 2023-10-14 RX ORDER — DOXYCYCLINE HYCLATE 20 MG
20 TABLET ORAL 2 TIMES DAILY
COMMUNITY
Start: 2023-03-16

## 2023-10-14 RX ORDER — ACETYLGLUCOSAMINE 700 MG
CAPSULE ORAL DAILY
COMMUNITY

## 2023-10-14 RX ORDER — PROGESTERONE 200 MG/1
200 CAPSULE ORAL DAILY
COMMUNITY
Start: 2023-03-03

## 2023-10-14 RX ORDER — BIMATOPROST 3 UG/ML
1 SOLUTION TOPICAL NIGHTLY
COMMUNITY
Start: 2023-09-19

## 2023-10-14 RX ORDER — SPIRONOLACTONE 50 MG/1
50 TABLET, FILM COATED ORAL DAILY
COMMUNITY
Start: 2022-12-22

## 2023-10-14 RX ORDER — FLUOCINOLONE ACETONIDE 0.1 MG/ML
1 SOLUTION TOPICAL 2 TIMES DAILY PRN
COMMUNITY
Start: 2022-07-28

## 2023-10-14 RX ORDER — PROGESTERONE 100 MG/1
1 CAPSULE ORAL NIGHTLY
COMMUNITY
Start: 2021-07-29

## 2023-10-14 RX ORDER — TRETINOIN 1 MG/G
1 CREAM TOPICAL NIGHTLY
COMMUNITY
Start: 2022-10-05

## 2023-10-14 RX ORDER — METRONIDAZOLE 7.5 MG/G
1 CREAM TOPICAL 2 TIMES DAILY
COMMUNITY
Start: 2023-03-20

## 2023-10-14 RX ORDER — IBUPROFEN 100 MG/5ML
1000 SUSPENSION, ORAL (FINAL DOSE FORM) ORAL DAILY
COMMUNITY

## 2023-10-14 RX ORDER — ESTRADIOL 1 MG/1
0.5 TABLET ORAL 2 TIMES DAILY
COMMUNITY
Start: 2023-03-03 | End: 2023-11-28

## 2023-10-14 RX ORDER — CLINDAMYCIN PHOSPHATE 11.9 MG/ML
1 SOLUTION TOPICAL 2 TIMES DAILY
COMMUNITY
Start: 2023-09-14

## 2023-10-17 ENCOUNTER — OFFICE VISIT (OUTPATIENT)
Dept: ORTHOPEDIC SURGERY | Facility: CLINIC | Age: 63
End: 2023-10-17
Payer: COMMERCIAL

## 2023-10-17 ENCOUNTER — ANCILLARY PROCEDURE (OUTPATIENT)
Dept: RADIOLOGY | Facility: CLINIC | Age: 63
End: 2023-10-17
Payer: COMMERCIAL

## 2023-10-17 DIAGNOSIS — S52.501S CLOSED FRACTURE OF DISTAL END OF RIGHT RADIUS, UNSPECIFIED FRACTURE MORPHOLOGY, SEQUELA: ICD-10-CM

## 2023-10-17 PROCEDURE — 99024 POSTOP FOLLOW-UP VISIT: CPT | Performed by: INTERNAL MEDICINE

## 2023-10-17 PROCEDURE — 73110 X-RAY EXAM OF WRIST: CPT | Mod: RIGHT SIDE | Performed by: INTERNAL MEDICINE

## 2023-10-17 PROCEDURE — 1036F TOBACCO NON-USER: CPT | Performed by: INTERNAL MEDICINE

## 2023-10-17 PROCEDURE — 1159F MED LIST DOCD IN RCRD: CPT | Performed by: INTERNAL MEDICINE

## 2023-10-17 PROCEDURE — 73110 X-RAY EXAM OF WRIST: CPT | Mod: RT,FY

## 2023-10-17 ASSESSMENT — PATIENT HEALTH QUESTIONNAIRE - PHQ9
SUM OF ALL RESPONSES TO PHQ9 QUESTIONS 1 AND 2: 0
1. LITTLE INTEREST OR PLEASURE IN DOING THINGS: NOT AT ALL
2. FEELING DOWN, DEPRESSED OR HOPELESS: NOT AT ALL

## 2023-10-17 NOTE — PROGRESS NOTES
Acute Injury New Patient Visit    CC:   Chief Complaint   Patient presents with    Right Wrist - Follow-up, Pain     FUV-  1. Right worst distal radius Fx   DOI- 9/6/23  X-Rays today        HPI: Pauly is a 63 y.o. female follow-up for nondisplaced distal radius fracture.  She is wearing a fracture brace, feeling better no complaints today.        Review of Systems   GENERAL: Negative for malaise, significant weight loss, fever  MUSCULOSKELETAL: See HPI  NEURO:  Negative for numbness / tingling     Past Medical History  History reviewed. No pertinent past medical history.    Medication review  Medication Documentation Review Audit       Reviewed by Irma Cole MA (Medical Assistant) on 10/17/23 at 1321      Medication Order Taking? Sig Documenting Provider Last Dose Status   ascorbic acid (Vitamin C) 1,000 mg tablet [id number]  Take 1 tablet (1,000 mg) by mouth once daily. Historical Provider, MD  Active   bimatoprost (Latisse) 0.03 % ophthalmic solution [id number]  Administer 1 Application into both eyes once daily at bedtime. Apply to both eyelids along upper eyelid lash line at nightime. Do not use on lower eyelid Historical MD Chau  Active   CHOLECALCIFEROL, VITAMIN D3, ORAL [id number]  Take 1 tablet by mouth once daily. Historical Provider, MD  Active   clindamycin (Cleocin T) 1 % external solution [id number]  Apply 1 Application topically 2 times a day. APPLY TO SCALP Historical Provider, MD  Active   DOCOSAHEXAENOIC ACID ORAL [id number]  Take by mouth once daily. Historical Provider, MD  Active   doxycycline (Periostat) 20 mg tablet [id number]  Take 1 tablet (20 mg) by mouth 2 times a day. AVOID TAKING WITH CALCIUM PRODUCTS LIKE CHEESE, MILK, YOGURT OR MULTI-VITAMINS. WAIT AT LEAST 30 MINUTES BEFORE LYING DOWN. Historical Provider, MD  Active   estradiol (Estrace) 1 mg tablet [id number]  Take 0.5 tablets (0.5 mg) by mouth twice a day. Historical Provider, MD  Active   fluocinolone (Synalar) 0.01 % external  solution [id number]  Apply 1 Application topically 2 times a day as needed. Apply to affected area on scalp twice daily as needed. Not for face, armpits, or groin. Cycle 2 weeks on, 1 week off. Do not use for one week prior to next appointment Historical Provider, MD  Active   Lacto 51/Bifi 3/L.lact/S.therm (DAILY PROBIOTIC, 10 STRAINS, ORAL) [id number]  Take 1 tablet by mouth once daily. Historical Provider, MD  Active   methylsulfonylmethane 1,000 mg capsule [id number]  Take by mouth once daily. Historical Provider, MD  Active   metroNIDAZOLE (Metrocream) 0.75 % cream [id number]  Apply 1 Application topically 2 times a day. Apply to areas affected by rosacea Historical Provider, MD  Active   NON FORMULARY [id number]  Take by mouth once daily. Tri iodine thyroid Historical Provider, MD  Active   PRASTERONE, DHEA, ORAL [id number]  Take by mouth once daily. Historical Provider, MD  Active   progesterone (Prometrium) 100 mg capsule [id number]  Take 1 capsule (100 mg) by mouth once daily at bedtime. Historical Provider, MD  Active   progesterone (Prometrium) 200 mg capsule [id number]  Take 1 capsule (200 mg) by mouth once daily. Historical Provider, MD  Active   spironolactone (Aldactone) 50 mg tablet [id number]  Take 1 tablet (50 mg) by mouth once daily. Drink more water while taking this medication Historical MD Chau  Active   THYMOL-CHLOROPHYLLIN ORAL [id number]  Take by mouth once daily. Historical Provider, MD  Active   traMADol (Ultram) 50 mg tablet [id number]  Take 1 tablet (50 mg) by mouth every 12 hours. Historical Provider, MD  Active   tretinoin (Retin-A) 0.1 % cream [id number]  Apply 1 Application topically once daily at bedtime. APPLY A THIN LAYER TO THE FACE Historical Provider, MD  Active   VITAMIN B COMPLEX ORAL [id number]  Take 1 capsule by mouth once daily. Historical Provider, MD  Active                    Allergies  Allergies   Allergen Reactions    Penicillins Swelling       Social History  Social History      Socioeconomic History    Marital status:      Spouse name: Not on file    Number of children: Not on file    Years of education: Not on file    Highest education level: Not on file   Occupational History    Not on file   Tobacco Use    Smoking status: Never    Smokeless tobacco: Never   Substance and Sexual Activity    Alcohol use: Not on file    Drug use: Not on file    Sexual activity: Not on file   Other Topics Concern    Not on file   Social History Narrative    Not on file     Social Determinants of Health     Financial Resource Strain: Not on file   Food Insecurity: Not on file   Transportation Needs: Not on file   Physical Activity: Not on file   Stress: Not on file   Social Connections: Not on file   Intimate Partner Violence: Not on file   Housing Stability: Not on file       Surgical History  History reviewed. No pertinent surgical history.    Physical Exam:  GENERAL:  Patient is awake, alert, and oriented to person place and time.  Patient appears well nourished and well kept.  Affect Calm, Not Acutely Distressed.  HEENT:  Normocephalic, Atraumatic, EOMI  CARDIOVASCULAR:  Hemodynamically stable.  RESPIRATORY:  Normal respirations with unlabored breathing.  Extremity: Right hand and wrist shows skin is intact.  No obvious deformity.  There is no pain with distal radius or distal ulna.  There is no pain of the scaphoid bone.  She can pronate and supinate with no pain discomfort.  Mild stiffness with flexion and and extension.  She is neurovascular intact.      Diagnostics: X-rays reviewed  XR wrist  Interpreted By:  CRISTIN FOY MD  MRN: [id number]  Patient Name: KEYONA POWELL     STUDY:  WRIST COMPLT; MIN 3 VIEWS;  Right;  9/19/2023 2:19 pm     INDICATION:  pain  S52.509A: Distal radius fracture.     ACCESSION NUMBER(S):  [id number]     ORDERING CLINICIAN:  CRISTIN FOY     FINDINGS:  Right wrist x-rays three views AP, lateral and oblique view: Stable  appearing and satisfactory healing  nondisplaced distal radius  fracture, showing signs of interval healing with increased callus  formation.         Procedure: None     Assessment: Right distal radius fracture    Plan: Pauly is here for follow-up for nondisplaced right distal radius fracture, she is clinically doing, well x-rays show signs interval healing with increased callus formation.  We will begin to wean her out of fracture brace we taught her some various range of motion exercises.  She may return to work on October 30 with no restrictions.  We will have her follow-up as needed.    Orders Placed This Encounter    XR wrist right 3+ views      At the conclusion of the visit there were no further questions by the patient/family regarding their plan of care.  Patient was instructed to call or return with any issues, questions, or concerns regarding their injury and/or treatment plan described above.     10/17/23 at 1:55 PM - Rickey Sánchez MD    Office: [phone]    This note was prepared using voice recognition software.  The details of this note are correct and have been reviewed, and corrected to the best of my ability.  Some grammatical errors may persist related to the Dragon software.

## 2023-10-17 NOTE — LETTER
October 17, 2023     Patient: Pauly Miguel   YOB: 1960   Date of Visit: 10/17/2023       To Whom It May Concern:    It is my medical opinion that Pauly Miguel may return to work full duty with no restrictions Oct 30, 2023.     If you have any questions or concerns, please don't hesitate to call.         Sincerely,        Rickey Sánchez MD    CC: No Recipients

## 2023-10-26 ENCOUNTER — HOSPITAL ENCOUNTER (OUTPATIENT)
Dept: WOMENS IMAGING | Age: 63
Discharge: HOME | End: 2023-10-28
Payer: MEDICAID

## 2023-10-26 VITALS — HEIGHT: 64 IN | BODY MASS INDEX: 24.84 KG/M2

## 2023-10-26 DIAGNOSIS — Z12.31 ENCOUNTER FOR SCREENING MAMMOGRAM FOR MALIGNANT NEOPLASM OF BREAST: ICD-10-CM

## 2023-10-26 PROCEDURE — 77063 BREAST TOMOSYNTHESIS BI: CPT

## 2023-11-16 ENCOUNTER — HOSPITAL ENCOUNTER (OUTPATIENT)
Dept: NEUROLOGY | Facility: CLINIC | Age: 63
Discharge: HOME | End: 2023-11-16
Payer: COMMERCIAL

## 2023-11-16 DIAGNOSIS — G62.9 NEUROPATHY: ICD-10-CM

## 2023-11-16 PROCEDURE — 95886 MUSC TEST DONE W/N TEST COMP: CPT | Performed by: SPECIALIST

## 2023-11-16 PROCEDURE — 95912 NRV CNDJ TEST 11-12 STUDIES: CPT | Performed by: SPECIALIST

## 2023-11-21 ENCOUNTER — OFFICE VISIT (OUTPATIENT)
Dept: NEUROLOGY | Facility: CLINIC | Age: 63
End: 2023-11-21
Payer: COMMERCIAL

## 2023-11-21 VITALS
BODY MASS INDEX: 24.92 KG/M2 | WEIGHT: 146 LBS | SYSTOLIC BLOOD PRESSURE: 120 MMHG | HEIGHT: 64 IN | DIASTOLIC BLOOD PRESSURE: 81 MMHG | HEART RATE: 79 BPM

## 2023-11-21 DIAGNOSIS — G62.9 SMALL FIBER NEUROPATHY: Primary | ICD-10-CM

## 2023-11-21 PROCEDURE — 1159F MED LIST DOCD IN RCRD: CPT | Performed by: SPECIALIST

## 2023-11-21 PROCEDURE — 1036F TOBACCO NON-USER: CPT | Performed by: SPECIALIST

## 2023-11-21 PROCEDURE — 99215 OFFICE O/P EST HI 40 MIN: CPT | Performed by: SPECIALIST

## 2023-11-21 ASSESSMENT — PATIENT HEALTH QUESTIONNAIRE - PHQ9
4. FEELING TIRED OR HAVING LITTLE ENERGY: NEARLY EVERY DAY
3. TROUBLE FALLING OR STAYING ASLEEP OR SLEEPING TOO MUCH: NEARLY EVERY DAY
6. FEELING BAD ABOUT YOURSELF - OR THAT YOU ARE A FAILURE OR HAVE LET YOURSELF OR YOUR FAMILY DOWN: NOT AT ALL
SUM OF ALL RESPONSES TO PHQ QUESTIONS 1-9: 13
10. IF YOU CHECKED OFF ANY PROBLEMS, HOW DIFFICULT HAVE THESE PROBLEMS MADE IT FOR YOU TO DO YOUR WORK, TAKE CARE OF THINGS AT HOME, OR GET ALONG WITH OTHER PEOPLE: NOT DIFFICULT AT ALL
SUM OF ALL RESPONSES TO PHQ9 QUESTIONS 1 AND 2: 6
9. THOUGHTS THAT YOU WOULD BE BETTER OFF DEAD, OR OF HURTING YOURSELF: NOT AT ALL
1. LITTLE INTEREST OR PLEASURE IN DOING THINGS: NEARLY EVERY DAY
5. POOR APPETITE OR OVEREATING: SEVERAL DAYS
7. TROUBLE CONCENTRATING ON THINGS, SUCH AS READING THE NEWSPAPER OR WATCHING TELEVISION: NOT AT ALL
8. MOVING OR SPEAKING SO SLOWLY THAT OTHER PEOPLE COULD HAVE NOTICED. OR THE OPPOSITE, BEING SO FIGETY OR RESTLESS THAT YOU HAVE BEEN MOVING AROUND A LOT MORE THAN USUAL: NOT AT ALL
2. FEELING DOWN, DEPRESSED OR HOPELESS: NEARLY EVERY DAY

## 2023-11-21 ASSESSMENT — ANXIETY QUESTIONNAIRES
IF YOU CHECKED OFF ANY PROBLEMS ON THIS QUESTIONNAIRE, HOW DIFFICULT HAVE THESE PROBLEMS MADE IT FOR YOU TO DO YOUR WORK, TAKE CARE OF THINGS AT HOME, OR GET ALONG WITH OTHER PEOPLE: NOT DIFFICULT AT ALL
GAD7 TOTAL SCORE: 8
4. TROUBLE RELAXING: MORE THAN HALF THE DAYS
5. BEING SO RESTLESS THAT IT IS HARD TO SIT STILL: NOT AT ALL
7. FEELING AFRAID AS IF SOMETHING AWFUL MIGHT HAPPEN: NOT AT ALL
6. BECOMING EASILY ANNOYED OR IRRITABLE: NOT AT ALL
1. FEELING NERVOUS, ANXIOUS, OR ON EDGE: NOT AT ALL
3. WORRYING TOO MUCH ABOUT DIFFERENT THINGS: NEARLY EVERY DAY
2. NOT BEING ABLE TO STOP OR CONTROL WORRYING: NEARLY EVERY DAY

## 2023-11-21 ASSESSMENT — ENCOUNTER SYMPTOMS
LOSS OF SENSATION IN FEET: 0
OCCASIONAL FEELINGS OF UNSTEADINESS: 0
DEPRESSION: 0

## 2023-11-21 NOTE — PROGRESS NOTES
The patient presents with complaints of swelling, puffiness, and tightness in her clothes over the last couple of weeks, along with a distended stomach. The patient has a history of negative results for Sjogren and rheumatoid and prefers not to undergo treatment at this time.    She has been experiencing dry eye syndrome and dry mouth as her most bothersome symptoms. She has previously tried tear duct plugs, but without success. The patient also describes feeling extremely stiff and is unsure if this is a psychological response or an actual change in her condition. She mentions that her hands and feet have been extremely cold and painful lately, particularly during cold weather.      She was evaluated by Rheumatology Dr. Paulson who offered treatment for Sjoegren Syndrome and Rheumatoid arthritis.     She expresses concern about the potential side effects of treatment, such as hair loss and depression associated with chemotherapy drugs. She is also worried about the growing inflammation and the possibility of it becoming more advanced and harder to treat in the future. The patient acknowledges the existence of various treatments for autoimmune diseases but is hesitant to start any treatment due to potential side effects.    - Diagnostic test results:    - Nerve conduction study: Minimal carpal tunnel detected.    - Autonomic nervous system testing (October 9th, 2023): Small fiber nerve disease consistent with Sjogren's syndrome identified.     GENERAL APPEARANCE:  No distress, alert and cooperative.           OPHTHALMOSCOPIC: The ophthalmoscopic exam normal.       CRANIAL NERVES:  Cranial nerves were normal.      CN 2- Visual fields full to confrontation.      CN 3, 4, 6-  Pupils round, 4 mm in diameter, equally reactive to light. No ptosis. EOMs normal alignment, full range of movement, no nystagmus     CN 5- Facial sensation intact bilaterally. Normal corneal reflexes.      CN 7- Normal and symmetric facial  strength. Nasolabial folds symmetric.     CN 8- Hearing intact to finger rub, whisper.      CN 9- Palate elevates symmetrically. Normal gag reflex.      CN 11- Normal strength of shoulder shrug and neck turning      CN 12- Tongue midline, with normal bulk and strength; no fasciculations.     MOTOR:  Motor exam was normal. Muscle bulk and tone were normal in both upper and lower extremities. Muscle strength was 5/5 in distal and proximal muscles in both upper and lower extremities. No fasciculations, tremor or other abnormal movements were present.     REFLEXES:  Right/ Left:  Biceps 2/2, brachioradialis 2/2, triceps 2/2, patellar 2/2, ankle 2/2  Babinski: toes downgoing to plantar stimulation. No clonus, frontal release signs or other pathologic reflexes present.     SENSORY: Sensory exam was intact to light touch, sharp/dull, vibration and position sense in both UE and LE.     COORDINATION: MALLIKA were intact in upper and lower extremities.  In UE- finger-nose-finger was intact and in LE- heel-to-shin was intact without dysmetria or overshoot.      GAIT: Station was stable with a normal base and negative Romberg sign. Gait was stable with a normal arm swing and speed. No ataxia, shuffling, steppage or waddling was noted. Tandem gait was intact. Postural reflexes were normal. 1. Small fiber neuropathy secondary to       Assessment and plan:  Sjogren's Syndrome   - Plan:    - Continue monitoring symptoms and progression.    - Referral to a rheumatologist for further evaluation and management of Sjogren's Syndrome.    - Consider anti-inflammatory medications as recommended by the rheumatologist.    2.  Dry eye syndrome   - Plan:      - Consider alternative treatments such as artificial tears, lubricating eye ointments, or prescription eye drops.    - Monitor for changes in symptoms and report to the ophthalmologist.    3.  Dry mouth   - Plan:    - Encourage frequent sips of water and sugar-free gum or lozenges to  stimulate saliva production.    - Consider saliva substitutes or prescription medications to increase saliva production if symptoms persist.    - Regular dental check-ups to monitor for potential complications related to dry mouth.    4. Muscle stiffness and discomfort:  - Plan:    - Encourage gentle stretching and low-impact exercises to maintain flexibility and muscle strength.    - Consider over-the-counter pain relievers (e.g., acetaminophen, ibuprofen) as needed for pain management.    - Monitor for changes in symptoms and report to the rheumatologist.    5. Raynaud' Syndrome Discoloration, cold hands and feet:  - Plan:    - Encourage the use of warm clothing and heating pads to maintain warmth.    - To consider Nifedipine ER  30 mg po daily.     - Monitor for changes in symptoms and report to the rheumatologist.    The patient to follow up  in my office as needed.     Time spent 40 minutes including reviewing medical records includes examining, consulting the patient .        Arnel Major M.D.

## 2023-11-21 NOTE — PATIENT INSTRUCTIONS
Dear Pauly,    Thank you for your visit today. It's great to see you taking charge of your health and addressing your concerns. Based on our discussion, I have provided a list of instructions for you to follow:    -  Consider consulting with your Rheumatologist to discuss the best treatment options for your Sjogren's Syndrome and small fiber neuropathy.  - Be aware of the various treatment options available for autoimmune diseases, including anti-inflammatory drugs, chemotherapy-like treatments, and biological drugs.  - Keep in mind that treating the underlying Sjogren's Syndrome may help alleviate your small fiber neuropathy symptoms.  - Remember that everyone's experience with medications and supplements, may vary. It's important to consult with your healthcare provider before starting any new treatments.  - Stay informed about the development of new medications with fewer side effects and more efficacy. Researchers are continuously working to improve treatment options for patients like you.    Please don't hesitate to reach out if you have any questions or concerns. We're here to support you on your journey to better health. Wishing you the best.    Sincerely,    Dr. Arnel Major  Neurology Specialist

## 2023-11-27 NOTE — ADDENDUM NOTE
Encounter addended by: Arnel Major MD on: 11/27/2023 12:20 PM   Actions taken: Charge Capture section accepted

## 2023-11-28 NOTE — ADDENDUM NOTE
Encounter addended by: Arnel Major MD on: 11/27/2023 9:35 PM   Actions taken: Charge Capture section accepted

## 2023-12-11 ENCOUNTER — APPOINTMENT (OUTPATIENT)
Dept: NEUROLOGY | Facility: CLINIC | Age: 63
End: 2023-12-11
Payer: COMMERCIAL

## 2024-05-16 DIAGNOSIS — M18.0 PRIMARY OSTEOARTHRITIS OF BOTH FIRST CARPOMETACARPAL JOINTS: Primary | ICD-10-CM

## 2024-05-17 ENCOUNTER — HOSPITAL ENCOUNTER (OUTPATIENT)
Dept: RADIOLOGY | Facility: CLINIC | Age: 64
Discharge: HOME | End: 2024-05-17
Payer: MEDICARE

## 2024-05-17 DIAGNOSIS — M18.0 PRIMARY OSTEOARTHRITIS OF BOTH FIRST CARPOMETACARPAL JOINTS: ICD-10-CM

## 2024-05-17 PROCEDURE — 73130 X-RAY EXAM OF HAND: CPT | Mod: BILATERAL PROCEDURE | Performed by: STUDENT IN AN ORGANIZED HEALTH CARE EDUCATION/TRAINING PROGRAM

## 2024-05-17 PROCEDURE — 73130 X-RAY EXAM OF HAND: CPT | Mod: 50

## 2024-06-06 ENCOUNTER — OFFICE VISIT (OUTPATIENT)
Dept: NEUROLOGY | Facility: CLINIC | Age: 64
End: 2024-06-06
Payer: MEDICARE

## 2024-06-06 VITALS
SYSTOLIC BLOOD PRESSURE: 132 MMHG | DIASTOLIC BLOOD PRESSURE: 83 MMHG | BODY MASS INDEX: 24.92 KG/M2 | HEART RATE: 93 BPM | WEIGHT: 146 LBS | HEIGHT: 64 IN

## 2024-06-06 DIAGNOSIS — I73.00 RAYNAUD'S DISEASE WITHOUT GANGRENE: ICD-10-CM

## 2024-06-06 DIAGNOSIS — G62.9 SMALL FIBER NEUROPATHY: Primary | ICD-10-CM

## 2024-06-06 PROCEDURE — 99214 OFFICE O/P EST MOD 30 MIN: CPT | Performed by: SPECIALIST

## 2024-06-06 PROCEDURE — 1125F AMNT PAIN NOTED PAIN PRSNT: CPT | Performed by: SPECIALIST

## 2024-06-06 PROCEDURE — 1159F MED LIST DOCD IN RCRD: CPT | Performed by: SPECIALIST

## 2024-06-06 RX ORDER — ESTRADIOL 0.04 MG/D
PATCH, EXTENDED RELEASE TRANSDERMAL
COMMUNITY
Start: 2024-05-22

## 2024-06-06 RX ORDER — FLUOROURACIL 50 MG/G
CREAM TOPICAL
COMMUNITY
Start: 2023-11-29

## 2024-06-06 ASSESSMENT — ENCOUNTER SYMPTOMS
OCCASIONAL FEELINGS OF UNSTEADINESS: 0
LOSS OF SENSATION IN FEET: 0
DEPRESSION: 1

## 2024-06-06 ASSESSMENT — ANXIETY QUESTIONNAIRES
5. BEING SO RESTLESS THAT IT IS HARD TO SIT STILL: NOT AT ALL
IF YOU CHECKED OFF ANY PROBLEMS ON THIS QUESTIONNAIRE, HOW DIFFICULT HAVE THESE PROBLEMS MADE IT FOR YOU TO DO YOUR WORK, TAKE CARE OF THINGS AT HOME, OR GET ALONG WITH OTHER PEOPLE: SOMEWHAT DIFFICULT
1. FEELING NERVOUS, ANXIOUS, OR ON EDGE: SEVERAL DAYS
2. NOT BEING ABLE TO STOP OR CONTROL WORRYING: SEVERAL DAYS
7. FEELING AFRAID AS IF SOMETHING AWFUL MIGHT HAPPEN: NOT AT ALL
6. BECOMING EASILY ANNOYED OR IRRITABLE: NOT AT ALL
3. WORRYING TOO MUCH ABOUT DIFFERENT THINGS: SEVERAL DAYS
GAD7 TOTAL SCORE: 4
4. TROUBLE RELAXING: SEVERAL DAYS

## 2024-06-06 ASSESSMENT — PAIN SCALES - GENERAL: PAINLEVEL: 4

## 2024-06-06 NOTE — PROGRESS NOTES
The patient presents with complaints of swelling, puffiness, and tightness in her clothes over the last couple of weeks, along with a distended stomach. The patient has a history of negative results for Sjogren and rheumatoid and prefers not to undergo treatment at this time.    She has been experiencing dry eye syndrome and dry mouth as her most bothersome symptoms. She has previously tried tear duct plugs, but without success. The patient also describes feeling extremely stiff and is unsure if this is a psychological response or an actual change in her condition. She mentions that her hands and feet have been extremely cold and painful lately, particularly during cold weather.      She was evaluated by Rheumatology Dr. Paulson who offered treatment for Sjoegren Syndrome and Rheumatoid arthritis.     She expresses concern about the potential side effects of treatment, such as hair loss and depression associated with chemotherapy drugs. She is also worried about the growing inflammation and the possibility of it becoming more advanced and harder to treat in the future. The patient acknowledges the existence of various treatments for autoimmune diseases but is hesitant to start any treatment due to potential side effects.    - Diagnostic test results:    - Nerve conduction study: Minimal carpal tunnel detected.    - Autonomic nervous system testing (October 9th, 2023): Small fiber nerve disease consistent with Sjogren's syndrome identified.    Since seen last, The patient reports experiencing significant redness on her lower extremities last year, attributed to an unidentified virus, which resolved with steroid treatment but recurred two months ago. The patient expresses concerns about potential small fiber neuropathy and diabetic neuropathy, given her symptoms. She has been managing ongoing gastrointestinal issues, including bloating, nausea, and difficulty with elimination, through medication and maintaining a  "food diary to monitor her condition.    In August, the patient was bedridden for three weeks due to an unexplained virus diagnosed at an urgent care facility. She has a history of autoimmune conditions, including Sjogren's syndrome and rheumatoid arthritis, contributing to her current health challenges. Despite dietary efforts, the patient has not lost weight and maintains a BMI of 25.06, experiencing nausea, an inability to eat, and increased inflammation and discomfort, which complicates her eating process.    The patient is also dealing with significant stress, particularly following her mother's passing, and has not sought recent psychological help. To cope, she engages in daily affirmations and regular gym sessions. A test in October confirmed a diagnosis of small fiber neuropathy, with symptoms including cold feet, numbness in the bottom of the feet, dry mouth, dry eyes, and occasional palpitations. Additionally, the patient has a history of atrial fibrillation.    Furthermore, the patient has minor carpal tunnel syndrome, a large ganglion cyst, and osteoarthritis in her left thumb, causing significant pain. She is planning to undergo surgery on the thumb later this summer.       Physical exam:  /83 (BP Location: Right arm, Patient Position: Sitting, BP Cuff Size: Adult)   Pulse 93   Ht 1.626 m (5' 4\")   Wt 66.2 kg (146 lb)   BMI 25.06 kg/m²      GENERAL APPEARANCE:  No distress, alert and cooperative.           OPHTHALMOSCOPIC: The ophthalmoscopic exam normal.       CRANIAL NERVES:  Cranial nerves were normal.      MOTOR:  Normal muscle strength, bulk and tone      REFLEXES:  symmetrical.     SENSORY: Sensory exam was intact to light touch, sharp/dull, vibration and position sense in both UE and LE.     COORDINATION/ GAIT: Station was stable with a normal base and negative         Assessment and plan:   Small Fiber Neuropathy by Rheumatoid Arthritis and Sjogren's Syndrome    - Plan:    - Continue " monitoring symptoms, including cold feet, numbness, dry mouth, dry eyes, and occasional palpitations.       - Continue follow-up with rheumatologist to optimize treatment, and further evaluation of Gastrointestinal Intolerance       - Monitor Body Mass Index (BMI) and continue with diet and exercise routine.      - Assess any changes in fitness and clothing fit.    Carpal Tunnel Syndrome and Ganglion Cyst  Left thumb for osteoarthritis later this summer.    - Plan:    - Follow up with hand surgery,  Dr. Dhaval De León       Raynaud' Syndrome Discoloration, cold hands and feet:  - Plan:    - Encourage the use of warm clothing and heating pads to maintain warmth.    - To consider Nifedipine ER  30 mg po daily.     - Monitor for changes in symptoms and report to the rheumatologist.    The patient to follow up  in my office as needed.     Time spent 30 minutes including reviewing medical records includes examining, consulting the patient .        Arnel Major M.D.

## 2024-10-22 DIAGNOSIS — G56.02 LEFT CARPAL TUNNEL SYNDROME: Primary | ICD-10-CM

## 2024-10-22 DIAGNOSIS — M67.432 GANGLION CYST OF WRIST, LEFT: ICD-10-CM

## 2024-10-22 DIAGNOSIS — M65.332 TRIGGER FINGER, LEFT MIDDLE FINGER: ICD-10-CM

## 2024-10-28 NOTE — H&P (VIEW-ONLY)
Subjective   HPI Patient here for a pre-op evaluation. She's scheduled with Dr De León ( fax: [phone]) for L carpal tunnel release, L middle finger trigger release and excision of L volar wrist mass on 11/11/2024. Per pt - the volar wrist mass is near a blood vessel    She has had surgeries in the past without any complications - she reports she (and her sister) feels it takes her a bit longer to wake up fully from the anesthesia (did not require medical attention for this). She is physically active in her ADLs and exercises regularly including doing interval walking for 1-1.5 hours outside or doing cardio exercise inside for 30 minutes. She reports during this exercise, she is breathing heavy enough she wouldn't be able to carry on a conversation. No CP or BOWERS - exercise is limited by her MSK pain.     No hx of pulmonary conditions including but not limited to COPD, Asthma or ZEN. She has never smoked. No active illness such as cough, URI, fever or chills.    No hx of cardiac conditions including but not limited to MI, CAD, valvular disease, congenital heart defect or heart failure.    No recent hx of anemia. No hx of DM or kidney disease.    She is on numerous OTC supplements - she states the surgeon's team did review which ones she will need to stop.    She reports that continues to have vulvar pruritus. She took the diflucan x 2 ( by 72 hours). No unusual vaginal discharge. Yeast was noted as incidental finding on pap.      Review of Systems   All other systems reviewed and are negative.     Past Medical History:   PAST MEDICAL HISTORY   Diagnosis Date   • Alopecia, unspecified    • Basal cell cancer 2011    Around outer right eye    • Exposure to hepatitis C 09/2017    Hep C AB positive; viral load negative   • Malignant melanoma (HCC) 2022    excised from back   • Malignant melanoma of arm (HCC)    • Melanoma of lower leg (HCC) 2011   • Menopause 06/15/2009   • Raynaud disease    • Rheumatoid  arthritis (HCC)    • Ruptured appendix 2009   • Sjogren's disease (HCC)    • Small fiber neuropathy 01/18/2024    Per neurology (Dr Major with ) based on Autonomic nervous system testing (10/9/2023).     Past Surgical History:   PAST SURGICAL HISTORY   Procedure Laterality Date   • APPENDECTOMY  2009   • CARPAL TUNNEL  2009    right    • COLOGUARD  07/2019    negative   • COLONOSCOPY  205    normal   • EXCISION MALIGNANT LESIONS  2011   • FOOT LEFT OP SURGERY  2009    For the last three years, Cyst removed and Bursa sac repair    • LEG SURGERY HX      Ablation of varicose veins     Family History:   FAMILY HISTORY    Problem Relation Age of Onset   • Heart disease Mother    • COPD Mother    • Cervical Cancer Mother    • Ischemic Heart Disease Mother 80        Stents   • Hypertension Father    • Cancer Father    • other (Mesothelioma) Father    • other (Lupus) Father    • Stroke Maternal Grandmother 82   • other (Heart Attack) Maternal Grandfather 70   • Cancer Paternal Grandfather         unknown   • Breast Cancer Maternal Aunt         60s   • Breast Cancer Maternal Aunt         80s     Social History:   Social History     Tobacco Use   • Smoking status: Never   • Smokeless tobacco: Never   Substance Use Topics   • Alcohol use: Yes     Alcohol/week: 2.0 standard drinks of alcohol     Types: 2 Glasses of Wine (5oz) per week     Comment: a glass of wine with dinner daily   • Drug use: Never     Current Medications: estradiol (VIVELLE-DOT) 0.0375 mg/24 hr patch, Apply 1 Patch as directed two times a week., Disp: 24 Patch, Rfl: 1  ketoconazole (NIZORAL) 2 % cream, Apply to affected area two times a day., Disp: 60 g, Rfl: 1  spironolactone (ALDACTONE) 50 mg tablet, TAKE 1 TABLET BY MOUTH ONCE DAILY. DRINK MORE WATER WHILE TAKING THIS MEDICATION., Disp: 90 tablet, Rfl: 2  doxycycline 20 mg tablet, TAKE ONE TABLET BY MOUTH TWO TIMES A DAY. AVOID TAKING WITH CALCIUM PRODUCTS LIKE CHEESE, MILK, YOGURT OR MULTI-VITAMINS.  WAIT AT LEAST 30 MINUTES BRFORE LYING DOWN., Disp: 180 tablet, Rfl: 3  progesterone micronized (PROMETRIUM) 100 mg capsule, Take 1 capsule by mouth daily at bedtime. TAKE WITH FOOD., Disp: 90 capsule, Rfl: 4  Fluorouracil 5 % cream, Apply to affected area twice daily for 2-4 weeks. If not red and inflamed at 2 weeks then continue for total of 4 weeks. May stop for few days or decrease to once daily for discomfort, Disp: 40 g, Rfl: 0  tretinoin (RETIN-A) 0.1 % cream, APPLY A THIN LAYER TO THE FACE EVERY NIGHT AT BEDTIME, Disp: 45 g, Rfl: 5  bimatoprost (LATISSE) 0.03 % ophthalmic solution, Apply to both eyelids along upper eyelid lash line at nightime. Do not use on lower eyelid, Disp: 3 mL, Rfl: 3  OTC NUTRITIONAL SUPPLEMENT, Take by mouth once daily. Cleanse more supplement/colon cleanse, Disp: , Rfl:   OTC NUTRITIONAL SUPPLEMENT, Take by mouth once daily. Vision supplement/lutein, billberry, astaxathen, Disp: , Rfl:   cholecalciferol, vitamin D3, (VITAMIN D3 ORAL), Take 1 tablet by mouth once daily., Disp: , Rfl:   OTC NUTRITIONAL SUPPLEMENT, Take by mouth once daily. DIM detox, Disp: , Rfl:   OTC NUTRITIONAL SUPPLEMENT, Take by mouth once daily. Hair supplement, Disp: , Rfl:   OTC NUTRITIONAL SUPPLEMENT, Take by mouth once daily. curamed, Disp: , Rfl:   Ascorbic Acid 1,000 mg tablet, Take 1,000 mg by mouth once daily., Disp: , Rfl:   prasterone, DHEA, (DHEA ORAL), Take by mouth once daily., Disp: , Rfl:   Lactobac no.41/Bifidobact no.7 (PROBIOTIC-10 ORAL), Take by mouth once daily., Disp: , Rfl:   OTC NUTRITIONAL SUPPLEMENT, Take by mouth once daily. Mind care, Disp: , Rfl:   thymol/chlorophyllin (CHLOROPHYLL ORAL), Take by mouth once daily., Disp: , Rfl:   fluocinolone (SYNALAR) 0.01 % external solution, Apply to affected area on scalp twice daily as needed. Not for face, armpits, or groin. Cycle 2 weeks on, 1 week off. Do not use for one week prior to next appointment, Disp: 60 mL, Rfl: 2  terconazole (TERAZOL  "7) 0.4 % vaginal cream, Use 1 Applicator vaginally daily at bedtime., Disp: 45 g, Rfl: 0  omega 3-dha-epa-fish oil (FISH OIL) 100-160-1,000 mg cap, Take by mouth once daily. (Patient not taking: Reported on 10/28/2024), Disp: , Rfl:     No facility-administered encounter medications on file as of 10/28/2024.      Allergies:   ALLERGIES   Allergen Reactions   • Penicillins          Swelling       Vitals: /80  Pulse 78  Temp (Src) 97.3 (Temporal)  Ht 5' 4\" (1.63m)  Wt 0 lb (0.0kg)  SpO2 100%  LMP 06/15/2009            Objective   Physical Exam  HENT:      Head: Normocephalic and atraumatic.   Neck:      Thyroid: No thyromegaly.   Cardiovascular:      Rate and Rhythm: Normal rate and regular rhythm.      Heart sounds: Normal heart sounds. No murmur heard.  Pulmonary:      Effort: Pulmonary effort is normal.      Breath sounds: Normal breath sounds.   Musculoskeletal:         General: No tenderness.      Cervical back: Normal range of motion and neck supple.   Skin:     General: Skin is warm and dry.   Neurological:      Mental Status: She is alert.      Motor: Motor function is intact.      Gait: Gait is intact. Gait normal.   Psychiatric:         Mood and Affect: Mood and affect normal.        ASSESSMENT/PLAN:  1. Carpal tunnel syndrome of left wrist - ICD9: 354.0, ICD10: G56.02 (primary diagnosis)  L carpal tunnel syndrome planned for surgery with Dr De León on 11/11/2024. See #2  -  PROTHROMBIN TIME  -  COMPLETE BLOOD COUNT AND DIFFERENTIAL  -  ECG COMPLETE  -  COMPREHENSIVE METABOLIC PANEL    2. Pre-op evaluation - ICD9: V72.84, ICD10: Z01.818   L carpal tunnel release, L middle finger trigger release and excision of L volar wrist mass on 11/11/2024 with Dr De León. Pt aware of which OTC supplements to d/c prior to surgery - but I'd recommend she d/c all supplements due to concern that there may be something in one not listed. Pt will need to maintain the supplement for her constipation though. EKG updated " - reviewed, NSR and normal EKG. CBC, CMP and PT/INR ordered as her previous ones were outside of 30 days from surgery. No indication for CXR.    Patient at acceptable risk for planned procedure and is medically optimized to proceed with procedure.     Will fax note, EKG and labs to her surgeon - Dr De León ( fax: [phone])   -  PROTHROMBIN TIME  -  COMPLETE BLOOD COUNT AND DIFFERENTIAL  -  ECG COMPLETE  -  COMPREHENSIVE METABOLIC PANEL    3. Vaginal yeast infection - ICD9: 112.1, ICD10: B37.31  Yeast noted on pap - and pt admitted to vulvar pruritus. Diflucan taken x 2 ( by 72 hours) but symptoms persist. Will start intravaginal terazole treatment. If persistent - consider vaginal swab for candida (dx based on incidental finding on pap results)    Maria Guadalupe Mandujano PA-C

## 2024-10-29 ENCOUNTER — HOSPITAL ENCOUNTER (OUTPATIENT)
Dept: ULTRASOUND IMAGING | Age: 64
Discharge: HOME | End: 2024-10-31
Payer: MEDICARE

## 2024-10-29 ENCOUNTER — HOSPITAL ENCOUNTER (OUTPATIENT)
Dept: WOMENS IMAGING | Age: 64
Discharge: HOME | End: 2024-10-31
Payer: MEDICARE

## 2024-10-29 VITALS — HEIGHT: 64 IN | BODY MASS INDEX: 24.99 KG/M2

## 2024-10-29 DIAGNOSIS — N64.4 BREAST PAIN: ICD-10-CM

## 2024-10-29 PROCEDURE — G0279 TOMOSYNTHESIS, MAMMO: HCPCS

## 2024-10-29 PROCEDURE — 76642 ULTRASOUND BREAST LIMITED: CPT

## 2024-11-01 ENCOUNTER — APPOINTMENT (OUTPATIENT)
Dept: PREADMISSION TESTING | Facility: HOSPITAL | Age: 64
End: 2024-11-01
Payer: MEDICARE

## 2024-11-10 ENCOUNTER — ANESTHESIA EVENT (OUTPATIENT)
Dept: OPERATING ROOM | Facility: HOSPITAL | Age: 64
End: 2024-11-10
Payer: MEDICARE

## 2024-11-11 ENCOUNTER — ANESTHESIA (OUTPATIENT)
Dept: OPERATING ROOM | Facility: HOSPITAL | Age: 64
End: 2024-11-11
Payer: MEDICARE

## 2024-11-11 ENCOUNTER — HOSPITAL ENCOUNTER (OUTPATIENT)
Facility: HOSPITAL | Age: 64
Setting detail: OUTPATIENT SURGERY
Discharge: HOME | End: 2024-11-11
Payer: MEDICARE

## 2024-11-11 VITALS
HEIGHT: 64 IN | HEART RATE: 76 BPM | BODY MASS INDEX: 24.92 KG/M2 | WEIGHT: 146 LBS | DIASTOLIC BLOOD PRESSURE: 60 MMHG | SYSTOLIC BLOOD PRESSURE: 125 MMHG | TEMPERATURE: 97 F | OXYGEN SATURATION: 99 % | RESPIRATION RATE: 16 BRPM

## 2024-11-11 DIAGNOSIS — Z79.2 PROPHYLACTIC ANTIBIOTIC: Primary | ICD-10-CM

## 2024-11-11 DIAGNOSIS — G89.18 POST-OPERATIVE PAIN: ICD-10-CM

## 2024-11-11 DIAGNOSIS — M65.332 TRIGGER FINGER, LEFT MIDDLE FINGER: ICD-10-CM

## 2024-11-11 DIAGNOSIS — G56.02 LEFT CARPAL TUNNEL SYNDROME: ICD-10-CM

## 2024-11-11 DIAGNOSIS — M67.432 GANGLION CYST OF WRIST, LEFT: ICD-10-CM

## 2024-11-11 PROCEDURE — 7100000001 HC RECOVERY ROOM TIME - INITIAL BASE CHARGE

## 2024-11-11 PROCEDURE — 2720000007 HC OR 272 NO HCPCS

## 2024-11-11 PROCEDURE — 88304 TISSUE EXAM BY PATHOLOGIST: CPT | Mod: TC,STJLAB

## 2024-11-11 PROCEDURE — 2500000004 HC RX 250 GENERAL PHARMACY W/ HCPCS (ALT 636 FOR OP/ED): Performed by: NURSE ANESTHETIST, CERTIFIED REGISTERED

## 2024-11-11 PROCEDURE — 2500000004 HC RX 250 GENERAL PHARMACY W/ HCPCS (ALT 636 FOR OP/ED): Mod: JZ

## 2024-11-11 PROCEDURE — 7100000010 HC PHASE TWO TIME - EACH INCREMENTAL 1 MINUTE

## 2024-11-11 PROCEDURE — 3600000003 HC OR TIME - INITIAL BASE CHARGE - PROCEDURE LEVEL THREE

## 2024-11-11 PROCEDURE — 2500000004 HC RX 250 GENERAL PHARMACY W/ HCPCS (ALT 636 FOR OP/ED)

## 2024-11-11 PROCEDURE — 3700000002 HC GENERAL ANESTHESIA TIME - EACH INCREMENTAL 1 MINUTE

## 2024-11-11 PROCEDURE — 3700000001 HC GENERAL ANESTHESIA TIME - INITIAL BASE CHARGE

## 2024-11-11 PROCEDURE — 7100000009 HC PHASE TWO TIME - INITIAL BASE CHARGE

## 2024-11-11 PROCEDURE — 2500000005 HC RX 250 GENERAL PHARMACY W/O HCPCS

## 2024-11-11 PROCEDURE — A64721 PR REVISE MEDIAN N/CARPAL TUNNEL SURG: Performed by: NURSE ANESTHETIST, CERTIFIED REGISTERED

## 2024-11-11 PROCEDURE — 3600000008 HC OR TIME - EACH INCREMENTAL 1 MINUTE - PROCEDURE LEVEL THREE

## 2024-11-11 PROCEDURE — 7100000002 HC RECOVERY ROOM TIME - EACH INCREMENTAL 1 MINUTE

## 2024-11-11 PROCEDURE — A64721 PR REVISE MEDIAN N/CARPAL TUNNEL SURG: Performed by: ANESTHESIOLOGY

## 2024-11-11 PROCEDURE — 2500000001 HC RX 250 WO HCPCS SELF ADMINISTERED DRUGS (ALT 637 FOR MEDICARE OP): Performed by: ANESTHESIOLOGY

## 2024-11-11 RX ORDER — HYDROMORPHONE HYDROCHLORIDE 0.2 MG/ML
0.1 INJECTION INTRAMUSCULAR; INTRAVENOUS; SUBCUTANEOUS EVERY 5 MIN PRN
Status: DISCONTINUED | OUTPATIENT
Start: 2024-11-11 | End: 2024-11-11 | Stop reason: HOSPADM

## 2024-11-11 RX ORDER — HYDRALAZINE HYDROCHLORIDE 20 MG/ML
5 INJECTION INTRAMUSCULAR; INTRAVENOUS EVERY 30 MIN PRN
Status: DISCONTINUED | OUTPATIENT
Start: 2024-11-11 | End: 2024-11-11 | Stop reason: HOSPADM

## 2024-11-11 RX ORDER — PROPOFOL 10 MG/ML
INJECTION, EMULSION INTRAVENOUS AS NEEDED
Status: DISCONTINUED | OUTPATIENT
Start: 2024-11-11 | End: 2024-11-11

## 2024-11-11 RX ORDER — MIDAZOLAM HYDROCHLORIDE 1 MG/ML
1 INJECTION, SOLUTION INTRAMUSCULAR; INTRAVENOUS ONCE AS NEEDED
Status: DISCONTINUED | OUTPATIENT
Start: 2024-11-11 | End: 2024-11-11 | Stop reason: HOSPADM

## 2024-11-11 RX ORDER — MIDAZOLAM HYDROCHLORIDE 1 MG/ML
INJECTION, SOLUTION INTRAMUSCULAR; INTRAVENOUS AS NEEDED
Status: DISCONTINUED | OUTPATIENT
Start: 2024-11-11 | End: 2024-11-11

## 2024-11-11 RX ORDER — FENTANYL CITRATE 50 UG/ML
INJECTION, SOLUTION INTRAMUSCULAR; INTRAVENOUS AS NEEDED
Status: DISCONTINUED | OUTPATIENT
Start: 2024-11-11 | End: 2024-11-11

## 2024-11-11 RX ORDER — LIDOCAINE HYDROCHLORIDE 20 MG/ML
INJECTION, SOLUTION INFILTRATION; PERINEURAL AS NEEDED
Status: DISCONTINUED | OUTPATIENT
Start: 2024-11-11 | End: 2024-11-11 | Stop reason: HOSPADM

## 2024-11-11 RX ORDER — OXYCODONE HYDROCHLORIDE 10 MG/1
10 TABLET ORAL EVERY 4 HOURS PRN
Status: DISCONTINUED | OUTPATIENT
Start: 2024-11-11 | End: 2024-11-11 | Stop reason: HOSPADM

## 2024-11-11 RX ORDER — ALBUTEROL SULFATE 0.83 MG/ML
2.5 SOLUTION RESPIRATORY (INHALATION) ONCE AS NEEDED
Status: DISCONTINUED | OUTPATIENT
Start: 2024-11-11 | End: 2024-11-11 | Stop reason: HOSPADM

## 2024-11-11 RX ORDER — LIDOCAINE HYDROCHLORIDE 20 MG/ML
INJECTION, SOLUTION EPIDURAL; INFILTRATION; INTRACAUDAL; PERINEURAL AS NEEDED
Status: DISCONTINUED | OUTPATIENT
Start: 2024-11-11 | End: 2024-11-11

## 2024-11-11 RX ORDER — SODIUM CHLORIDE 9 MG/ML
INJECTION, SOLUTION INTRAVENOUS CONTINUOUS PRN
Status: DISCONTINUED | OUTPATIENT
Start: 2024-11-11 | End: 2024-11-11

## 2024-11-11 RX ORDER — OXYCODONE AND ACETAMINOPHEN 5; 325 MG/1; MG/1
1 TABLET ORAL EVERY 6 HOURS PRN
Qty: 12 TABLET | Refills: 0 | Status: SHIPPED | OUTPATIENT
Start: 2024-11-11 | End: 2024-11-14

## 2024-11-11 RX ORDER — SODIUM CHLORIDE, SODIUM LACTATE, POTASSIUM CHLORIDE, CALCIUM CHLORIDE 600; 310; 30; 20 MG/100ML; MG/100ML; MG/100ML; MG/100ML
100 INJECTION, SOLUTION INTRAVENOUS CONTINUOUS
Status: ACTIVE | OUTPATIENT
Start: 2024-11-11 | End: 2024-11-11

## 2024-11-11 RX ORDER — ONDANSETRON HYDROCHLORIDE 2 MG/ML
4 INJECTION, SOLUTION INTRAVENOUS ONCE AS NEEDED
Status: DISCONTINUED | OUTPATIENT
Start: 2024-11-11 | End: 2024-11-11 | Stop reason: HOSPADM

## 2024-11-11 RX ORDER — ACETAMINOPHEN 325 MG/1
975 TABLET ORAL ONCE
Status: DISCONTINUED | OUTPATIENT
Start: 2024-11-11 | End: 2024-11-11 | Stop reason: HOSPADM

## 2024-11-11 RX ORDER — BUPIVACAINE HYDROCHLORIDE 2.5 MG/ML
INJECTION, SOLUTION EPIDURAL; INFILTRATION; INTRACAUDAL AS NEEDED
Status: DISCONTINUED | OUTPATIENT
Start: 2024-11-11 | End: 2024-11-11 | Stop reason: HOSPADM

## 2024-11-11 RX ORDER — ONDANSETRON HYDROCHLORIDE 2 MG/ML
INJECTION, SOLUTION INTRAVENOUS AS NEEDED
Status: DISCONTINUED | OUTPATIENT
Start: 2024-11-11 | End: 2024-11-11

## 2024-11-11 RX ORDER — BACITRACIN ZINC 500 UNIT/G
OINTMENT IN PACKET (EA) TOPICAL AS NEEDED
Status: DISCONTINUED | OUTPATIENT
Start: 2024-11-11 | End: 2024-11-11 | Stop reason: HOSPADM

## 2024-11-11 RX ORDER — CLINDAMYCIN PHOSPHATE 900 MG/50ML
900 INJECTION, SOLUTION INTRAVENOUS ONCE
Status: COMPLETED | OUTPATIENT
Start: 2024-11-11 | End: 2024-11-11

## 2024-11-11 RX ORDER — DOXYCYCLINE 100 MG/1
100 CAPSULE ORAL 2 TIMES DAILY
Qty: 20 CAPSULE | Refills: 0 | Status: SHIPPED | OUTPATIENT
Start: 2024-11-11 | End: 2024-11-21

## 2024-11-11 RX ORDER — OXYCODONE AND ACETAMINOPHEN 5; 325 MG/1; MG/1
1 TABLET ORAL EVERY 4 HOURS PRN
Status: DISCONTINUED | OUTPATIENT
Start: 2024-11-11 | End: 2024-11-11 | Stop reason: HOSPADM

## 2024-11-11 RX ORDER — IBUPROFEN 600 MG/1
600 TABLET ORAL EVERY 6 HOURS PRN
Status: DISCONTINUED | OUTPATIENT
Start: 2024-11-11 | End: 2024-11-11 | Stop reason: HOSPADM

## 2024-11-11 RX ORDER — HYDROMORPHONE HYDROCHLORIDE 1 MG/ML
1 INJECTION, SOLUTION INTRAMUSCULAR; INTRAVENOUS; SUBCUTANEOUS EVERY 5 MIN PRN
Status: DISCONTINUED | OUTPATIENT
Start: 2024-11-11 | End: 2024-11-11 | Stop reason: HOSPADM

## 2024-11-11 SDOH — HEALTH STABILITY: MENTAL HEALTH: CURRENT SMOKER: 0

## 2024-11-11 ASSESSMENT — COLUMBIA-SUICIDE SEVERITY RATING SCALE - C-SSRS
1. IN THE PAST MONTH, HAVE YOU WISHED YOU WERE DEAD OR WISHED YOU COULD GO TO SLEEP AND NOT WAKE UP?: NO
2. HAVE YOU ACTUALLY HAD ANY THOUGHTS OF KILLING YOURSELF?: NO
6. HAVE YOU EVER DONE ANYTHING, STARTED TO DO ANYTHING, OR PREPARED TO DO ANYTHING TO END YOUR LIFE?: NO

## 2024-11-11 ASSESSMENT — PAIN SCALES - GENERAL
PAINLEVEL_OUTOF10: 6
PAINLEVEL_OUTOF10: 6
PAINLEVEL_OUTOF10: 5 - MODERATE PAIN
PAINLEVEL_OUTOF10: 0 - NO PAIN
PAINLEVEL_OUTOF10: 6
PAIN_LEVEL: 2

## 2024-11-11 ASSESSMENT — PAIN - FUNCTIONAL ASSESSMENT
PAIN_FUNCTIONAL_ASSESSMENT: 0-10

## 2024-11-11 NOTE — ANESTHESIA PROCEDURE NOTES
Airway  Date/Time: 11/11/2024 8:17 AM  Urgency: elective    Airway not difficult    Staffing  Performed: AGA   Authorized by: Sheree Lomeli MD    Performed by: AGA Liu  Patient location during procedure: OR    Indications and Patient Condition  Indications for airway management: anesthesia and airway protection  Spontaneous ventilation: present  Sedation level: moderate (conscious sedation)  Preoxygenated: yes  Patient position: sniffing  Mask difficulty assessment: 0 - not attempted    Final Airway Details  Final airway type: supraglottic airway      Successful airway: Size 4     Number of attempts at approach: 1  Ventilation between attempts: supraglottic airway

## 2024-11-11 NOTE — ANESTHESIA POSTPROCEDURE EVALUATION
Patient: Pauly Miguel    Procedure Summary       Date: 11/11/24 Room / Location: [facility] OR 05 / [facility] OR    Anesthesia Start: 0802 Anesthesia Stop: 1028    Procedure: Decompression Median Nerve with Carpal Tunnel Release. LEFT MIDDLE FINGER TRIGGER RELEASE. LEFT WRIST MASS EXCISION (Left) Diagnosis:       Left carpal tunnel syndrome      Trigger finger, left middle finger      Ganglion cyst of wrist, left      (Left carpal tunnel syndrome [G56.02])      (Trigger finger, left middle finger [M65.332])      (Ganglion cyst of wrist, left [M67.432])    Surgeons: Dhaval De León MD Responsible Provider: Sheree Lomeli MD    Anesthesia Type: general ASA Status: 3            Anesthesia Type: general    Vitals Value Taken Time   /67 11/11/24 1045   Temp 36.1 °C (97 °F) 11/11/24 1023   Pulse 75 11/11/24 1052   Resp 10 11/11/24 1052   SpO2 95 % 11/11/24 1052   Vitals shown include unfiled device data.    Anesthesia Post Evaluation    Patient location during evaluation: PACU  Patient participation: complete - patient participated  Level of consciousness: sleepy but conscious, responsive to light touch, responsive to verbal stimuli and responsive to physical stimuli  Pain score: 2  Pain management: adequate  Multimodal analgesia pain management approach  Airway patency: patent  Two or more strategies used to mitigate risk of obstructive sleep apnea  Cardiovascular status: acceptable, hemodynamically stable and stable  Respiratory status: acceptable, unassisted, spontaneous ventilation and nonlabored ventilation  Hydration status: acceptable  Postoperative Nausea and Vomiting: none        There were no known notable events for this encounter.

## 2024-11-11 NOTE — ANESTHESIA PREPROCEDURE EVALUATION
Patient: Pauly Miguel    Procedure Information       Date/Time: 11/11/24 0925    Procedure: Decompression Median Nerve with Carpal Tunnel Release (Left) - 2 hours    Location: [facility] OR  / [facility]    Surgeons: Dhaval De León MD            Relevant Problems   Neuro   (+) Left carpal tunnel syndrome      Musculoskeletal   (+) Left carpal tunnel syndrome      Skin   (+) Basal cell carcinoma       Clinical information reviewed:   Tobacco  Allergies  Meds   Med Hx  Surg Hx   Fam Hx  Soc Hx        NPO Detail:  NPO/Void Status  Date of Last Liquid: 11/10/24  Time of Last Liquid: 2030  Date of Last Solid: 11/10/24  Time of Last Solid: 2030  Last Intake Type: Clear fluids  Time of Last Void: 0530         Physical Exam    Airway  Mallampati: II  TM distance: >3 FB  Neck ROM: full     Cardiovascular - normal exam  Rhythm: regular  Rate: normal     Dental - normal exam     Pulmonary - normal exam  Breath sounds clear to auscultation     Abdominal - normal exam  Abdomen: soft  Bowel sounds: normal           Anesthesia Plan    History of general anesthesia?: yes  History of complications of general anesthesia?: no    ASA 3     general     The patient is not a current smoker.  Patient was previously instructed to abstain from smoking on day of procedure.  Patient did not smoke on day of procedure.  Education provided regarding risk of obstructive sleep apnea.  intravenous induction   Postoperative administration of opioids is intended.  Anesthetic plan and risks discussed with patient.  Use of blood products discussed with patient who consented to blood products.    Plan discussed with CAA.

## 2024-11-11 NOTE — OP NOTE
Decompression Median Nerve with Carpal Tunnel Release. LEFT MIDDLE FINGER TRIGGER RELEASE. LEFT WRIST MASS EXCISION (L) Operative Note     Date: 2024  OR Location: [facility] OR    Name: Pauly Miguel, : 1960, Age: 64 y.o., MRN: [id number], Sex: female    Diagnosis  Pre-op Diagnosis      * Left carpal tunnel syndrome [G56.02]     * Trigger finger, left middle finger [M65.332]     * Ganglion cyst of wrist, left [M67.432] Post-op Diagnosis     * Left carpal tunnel syndrome [G56.02]     * Trigger finger, left middle finger [M65.332]     * Ganglion cyst of wrist, left [M67.432]     Procedures  Decompression Median Nerve with Carpal Tunnel Release. LEFT MIDDLE FINGER TRIGGER RELEASE. LEFT WRIST MASS EXCISION  07801 - TX NEUROPLASTY &/TRANSPOS MEDIAN NRV CARPAL TUNNE    TX TENDON SHEATH INCISION [57693]  TX EXCISION GANGLION WRIST DORSAL/VOLAR PRIMARY [30043]  Surgeons   Dr. Dhaval De León MD    Resident/Fellow/Other Assistant:  Noble Schrader  Staff:   Surgical Assistant:   Scrub Person: Lai  Circulator: Poli Thomas Person: Antonio    Anesthesia Staff: Anesthesiologist: Sheree Lomeli MD  C-AA: AGA Liu    Procedure Summary  Anesthesia: General  ASA: III  Estimated Blood Loss: Less than 10 mL  Intra-op Medications:   Administrations occurring from 0925 to 1220 on 24:   Medication Name Total Dose   bupivacaine PF 0.25 % (Marcaine) 0.25 % (2.5 mg/mL) injection 7.5 mL   lidocaine (Xylocaine) 20 mg/mL (2 %) injection 7.5 mL   bacitracin ointment 1 Application   fentaNYL (Sublimaze) injection 50 mcg/mL 25 mcg   NaCl 0.9 % bolus Cannot be calculated              Anesthesia Record               Intraprocedure I/O Totals          Intake    NaCl 0.9 % bolus 350.00 mL    clindamycin (Cleocin) 900 mg in dextrose 5% IV 50 mL 50.00 mL    Total Intake 400 mL       Output    Est. Blood Loss 15 mL    Total Output 15 mL       Net    Net Volume 385 mL          Specimen:   ID Type Source Tests Collected by  Time   1 : 1) A-1 PULLEY, LEFT MIDDLE FINGER Tissue TENDON/TENDON SHEATH SURGICAL PATHOLOGY EXAM Dhaval De León MD 11/11/2024 0846   2 : 2) FLEXOR SYNOVOIM FDS/FDP, LEFT MIDDLE FINGER Tissue SYNOVIUM SURGICAL PATHOLOGY EXAM Dhaval De León MD 11/11/2024 0848   3 : 3) MASS/ MULTILOBULATED GANGLION CYST, LEFT WRIST Tissue SOFT TISSUE MASS RESECTION SURGICAL PATHOLOGY EXAM Dhaval De León MD 11/11/2024 0932                   Indications: Pauly Miguel is an 64 y.o. female who is having surgery for Left carpal tunnel syndrome [G56.02]  Trigger finger, left middle finger [M65.332]  Ganglion cyst of wrist, left [M67.432].  She is identified preoperatively by herself.  Risk and benefits surgical invention once again reviewed at length with her.  Risks include but are not limited to anesthesia, infection, bleeding, injury to adjacent structures, paralysis, paresthesias, dysfunction, deformity, scarring, recurrence, nonresolution of symptoms, possible need for further surgical interventions among others.  We have discussed the left carpal tunnel release, the left middle finger trigger finger release, and the excision of left volar wrist mass/ganglion cyst.  We have discussed possible pathologic etiologies and that this most likely represents a benign pathologic etiology that may have a significant rate of recurrence.  She does not wish to pursue corticosteroid injection for the left thumb basilar joint arthritis.  We have had significant and lengthy discussions regarding her current clinical situation and treatment options.  She understands all of our discussions.  She understands her current clinical situation and treatment options.  She wished to proceed with surgical intervention for left carpal tunnel release, left middle finger trigger finger release, and excision left volar wrist mass/ganglion cyst.  This will be 3 separate operative sites.  The consent is obtained.  The operative sites are marked with a marking pen.   She received preoperative IV antibiotics.  SCDs are in place.  The preoperative safety checklist was performed.  She is then taken to the operating room placed in supine position on the operating room table.    The patient was seen in the preoperative area. The risks, benefits, complications, treatment options, non-operative alternatives, expected recovery and outcomes were discussed with the patient. The possibilities of reaction to medication, pulmonary aspiration, injury to surrounding structures, bleeding, recurrent infection, the need for additional procedures, failure to diagnose a condition, and creating a complication requiring transfusion or operation were discussed with the patient. The patient concurred with the proposed plan, giving informed consent.  The site of surgery was properly noted/marked if necessary per policy. The patient has been actively warmed in preoperative area. Preoperative antibiotics have been ordered and given within 1 hours of incision. Venous thrombosis prophylaxis have been ordered including bilateral sequential compression devices    Procedure Details:     PREOPERATIVE DIAGNOSES:    1.  Left carpal tunnel syndrome.  2.  Left middle finger active painful trigger finger.  3.  Painful enlarging left volar wrist mass/ganglion cyst     POSTOPERATIVE DIAGNOSES:    1.  Left carpal tunnel syndrome.  2.  Left middle finger trigger finger with thickened A1 pulley and  thickened flexor synovium of the flexor digitorum superficialis and  flexor digitorum profundus tendons.  3.  Painful enlarging left volar wrist mass/ganglion cyst     OPERATIVE PROCEDURES:    1.  Left carpal tunnel release and median nerve at the wrist.  2.  Left middle finger trigger finger release of the A1 pulley and  flexor synovectomy of the flexor digitorum superficialis and flexor  digitorum profundus tendons.  3.  Exploration and excision left volar wrist mass/ganglion cyst    Attending surgeon: Dr. Dhaval De León,  MD    ASSISTANT:    Noble Schrader     ANESTHESIA:    General.     ESTIMATED BLOOD LOSS:    Less than 10 mL.     CONDITION:    Stable.     COMPLICATIONS:    None.     SPECIMENS:    Sent to Pathology for further evaluation.     DETAILS OF PROCEDURE:    She is identified preoperatively by herself.  Risk and benefits surgical invention once again reviewed at length with her.  Risks include but are not limited to anesthesia, infection, bleeding, injury to adjacent structures, paralysis, paresthesias, dysfunction, deformity, scarring, recurrence, nonresolution of symptoms, possible need for further surgical interventions among others.  We have discussed the left carpal tunnel release, the left middle finger trigger finger release, and the excision of left volar wrist mass/ganglion cyst.  We have discussed possible pathologic etiologies and that this most likely represents a benign pathologic etiology that may have a significant rate of recurrence.  She does not wish to pursue corticosteroid injection for the left thumb basilar joint arthritis.  We have had significant and lengthy discussions regarding her current clinical situation and treatment options.  She understands all of our discussions.  She understands her current clinical situation and treatment options.  She wished to proceed with surgical intervention for left carpal tunnel release, left middle finger trigger finger release, and excision left volar wrist mass/ganglion cyst.  This will be 3 separate operative sites.  The consent is obtained.  The operative sites are marked with a marking pen.  She received preoperative IV antibiotics.  SCDs are in place.  The preoperative safety checklist was performed.  She is then taken to the operating room placed in supine position on the operating room table.After adequate induction of general anesthesia, the patient's left  upper extremity was cleaned, prepped, and draped in usual sterile  fashion and a proximal upper  extremity tourniquet was applied.  The  entire procedure performed under loupe magnification.  The left hand  was further identified.  The appropriate mid central, longitudinal,  palmar incision site for the carpal tunnel release was marked with a  marking pen.  The appropriate angled Natty-type incision site was  marked for the left middle finger trigger finger release operative site.  The volar left wrist mass/ganglion cyst operative site was marked with a marking pen.  The  appropriate time-out procedure was performed.  The Esmarch was not  employed to exsanguinate the left upper extremity given the uncertain nature of the mass.  The the left upper extremity was elevated and proximal upper extremity tourniquet was inflated.  The left hand and wrist were then repositioned on the hand table and retracted with the aluminum hand retracting system.  The entire procedure was performed  under loupe magnification.  I began with carpal tunnel release  operative procedure.  The appropriate incision was made with the  scalpel.  Subcutaneous tissue was dissected with tenotomy scissors.  Excellent hemostasis was maintained with electrocautery.  A small  Heiss retractor and Ragnell retractors were employed to retract the  skin subcutaneous tissue.  Further dissection with tenotomy scissors  was performed.  The palmar aponeurosis was identified.  This was then  incised mid centrally and longitudinally with the scalpel.  Further  dissection with tenotomy scissors was performed.  The transverse  carpal ligament was identified.  This was then incised mid centrally  and longitudinally with the scalpel.  The median nerve was identified  directly underneath.  The mid central, longitudinal, transverse  carpal ligament incision was then extended distally in a longitudinal  fashion under direct vision with loupe magnification employing the  scalpel and tenotomy scissors.  Complete release from mid central to  distal was accomplished.   This was confirmed with a hemostat.  The  mid central, longitudinal, transverse carpal ligament incision was  then extended in longitudinal fashion proximally under direct vision  with loupe magnification employing the scalpel and tenotomy scissors.   Complete release of the transverse carpal ligament was accomplished.   This was once again confirmed with a hemostat.  The median nerve was  further identified.  It was briefly, gently, and meticulously  dissected along its lateral margins.  No other compressive pathologic  entities were identified in the carpal tunnel.  The operative site  area was then copiously irrigated with saline solution.  Excellent hemostasis was maintained with electrocautery.  A small  Saline-soaked Ray-Zeus sponge was then placed over the wound at  this time.       Next, I turned my attention to performing the left middle finger  trigger finger release operative procedure.  The appropriate incision  site in the palm over the head of the third metacarpal A1 pulley  flexor tendon system was identified.  The appropriate angled  Natty-type incision site previously marked with a marking pen was  identified.  The incisions were then made with the scalpel.  Subcutaneous tissue was dissected with tenotomy scissors.  Excellent  hemostasis was maintained with electrocautery.  The appropriate flap  was dissected, raised, elevated, and retracted.  A small Heiss  retractor and Ragnell retractors were employed to retract the skin  subcutaneous tissue.  Further dissection with tenotomy scissors was  performed.  The A1 pulley flexor tendon system to the left middle  finger was identified.  The radial and ulnar neurovascular structures  were appropriately identified and briefly, gently, and meticulously  dissected in both proximal and distal directions and retracted with  Ragnell retractors to maintain them free from injury throughout the  procedure.  The A1 pulley clinically appeared to be thickened  and  fibrotic.  This was then incised mid centrally and longitudinally  with the scalpel.  This was extended in longitudinal fashion both  proximal and distal directions under direct vision with loupe  magnification employing the scalpel and tenotomy scissors for  complete release of the A1 pulley.  Complete release of the A1 pulley  was accomplished and this was confirmed with a hemostat.  The A2  pulley was confirmed to remain intact with the hemostat.  A small  section of the cut edge of the A1 pulley was then sharply transected  with the sharp curved iris scissors, excised, removed and passed off  table to be sent to Pathology for further evaluation.  The flexor  tendons to the left middle finger were then further examined and  retracted with a hemostat.  There was noted to be significant  thickened flexor synovium tethering these tendons.  This was grasped  with a hemostat and dissected from the FDS and FDP tendons with  tenotomy scissors excised, removed and passed off table to be sent to  Pathology for further evaluation.  The flexor tendons were then once  again examined and individually retracted with a hemostat as the  digit was placed through further passive range of motion.   No further triggering was identified.  No other masses were identified.  No other  pathologic entities involving either flexor tendon were identified at this level.  This operative site was further copiously irrigated with saline solution.  Excellent hemostasis was maintained with electrocautery.  This  operative site was then covered with saline soaked Ray-Zeus  sponge.      Next, I turned my attention performing the exploration and excision of the painful enlarging left volar wrist mass/ganglion cyst.  The longitudinal curvilinear incision site was marked with a marking pen.  The incision was then made with a scalpel.  The entire procedure performed under loupe magnification.  Further dissection with tenotomy scissors is performed.   Excellent hemostasis is maintained with electrocautery.  The appropriate flaps are dissected, raised, elevated, and retracted with a small Heiss retractor.  Further dissection with tenotomy scissors was performed.  The radial artery is appropriately identified briefly, gently, meticulously dissected and retracted with Ragnell retractors to maintain it free from injury throughout the procedure.  The mass is identified and clinically appears as a multilobulated ganglion cyst.  This was carefully gently meticulously dissected circumferentially and this was noted to be emanating from the volar wrist joint between the flexor carpi radialis tendon and the radial artery.  This was completely dissected excised at the base of the stalk removed and passed off table to be sent to pathology for further evaluation.  The base of the stalk area was cauterized with electrocautery.  Copious irrigation with saline solution was performed.  No other masses are identified.  The proximal upper extremity tourniquet was then released.  Total tourniquet time was 64 minutes.  The entire hand and all 5 digits became pink, warm, capillary refill 1 to 2 seconds.  The Doppler ultrasound machine was utilized and demonstrated the radial artery to be completely intact with excellent signal.  Excellent hemostasis is maintained with electrocautery.  Further copious irrigation with saline solution was performed.  Excellent hemostasis is demonstrated.  No other masses are demonstrated.  This area was covered with saline-soaked Ray-Zeus sponge.    Next, I began closure with the carpal tunnel operative site.  The  Ray-Zeus sponges were removed.  Wound was further explored and  examined under direct vision with loupe magnification and excellent  hemostasis was maintained with electrocautery.  Further copious  irrigation with saline saline solution was performed.  Excellent  hemostasis was demonstrated.  The skin edges were then reapproximated  with several  simple interrupted and 1 intermittent horizontal  mattress suture of 4-0 nylon.       Next, I turned my attention to the left middle finger trigger finger  release operative site.  The Ray-Zeus sponge was removed.  The wound  was explored under direct vision with loupe magnification.  Excellent  hemostasis was maintained with electrocautery.  Further copious  irrigation with saline solution was performed.  Excellent  hemostasis was demonstrated.  The skin edges were then reapproximated  with a few simple interrupted and intermittent horizontal mattress  sutures of 4-0 nylon.      Next, I turned my attention to the volar left wrist mass excision operative site.  The Ray-Zeus sponges were removed.  The wound was explored under direct vision with loupe magnification.  Excellent hemostasis was maintained with electrocautery.  Further copious  irrigation with saline solution was performed.  Excellent  hemostasis was demonstrated.  The skin edges were then reapproximated  with a few simple interrupted and intermittent inverted deep dermal sutures of 4-0 Monocryl.  The skin edge was then reapproximated with a running subcuticular 5-0 Monocryl suture.  Each operative site was then  injected in local circumferential fashion with 5 mL of 1:1 mixture of  2% plain lidocaine solution and 0.25% plain Marcaine solution for a  total use of 15 mL for prolonged postoperative pain relief.  Left hand and digits were further  cleaned and dried.  Mastisol and Steri-Strips were applied to the left volar wrist operative site.  Bacitracin, Xeroform were applied to all of the  operative sites.  The appropriate bulky protective immobilizing  sterile dressing and plaster splint was then fashioned and applied.  Distal tips of all  5 digits became pink, warm, and had a capillary refill of 1 to 2  seconds after tourniquet release and remained so after dressing  application.  She tolerated the procedure very well.  She awoke from  anesthesia without  difficulty and was transferred to the recovery  room in stable condition.         Dhaval De León Jr, MD    Complications:  None; patient tolerated the procedure well.    Disposition: PACU - hemodynamically stable.  Condition: stable     Tourniquet Times:     Total Tourniquet Time Documented:  Arm - Upper (Left) - 64 minutes  Total: Arm - Upper (Left) - 64 minutes      Findings: Left carpal tunnel syndrome, left middle finger trigger finger with thickened A1 pulley and thickened flexor synovium and left volar wrist mass/ganglion cyst      Attending Attestation: I was present and scrubbed for the entire procedure.    Dhaval De León  Phone Number: [phone]

## 2024-11-11 NOTE — DISCHARGE INSTRUCTIONS
Post-Operative Instructions    These discharge instructions provide you with general information on caring for yourself after you leave the hospital. Your doctor may also give you specific instructions. If you have any problems or questions after discharge, please call Dr. De León office [phone].  Home Going Instructions:  Take over-the-counter or prescription medications for pain as directed. Resume your usual medications at home.   Keep your hand raised (elevated) for 2-3 days on 4-5 pillows above the level of your heart as much possible even when sleeping. This keeps swelling down and helps with discomfort.  Gently move your fingers to prevent stiffness, DO NOT USE HAND. Straighten fingers to full extension. No gripping weights, grabbing, pushing, pulling, lifting or carrying.  Use hand for very minimal activity. Ex: Buttons, Zippers.  When showering cover hand with a plastic bag to keep the dressing clean, dry and intact.   DO NOT CHANGE DRESSING, Dr. De León will remove dressing in his office during the follow up visit in 3-4 days.  Call your Doctor at his office if:  You develop severe pain not relieved by medications.  You develop bleeding from your surgical site.  You have an oral temperature about 101°F.  You develop redness or swelling of the surgical site.  SEEK IMMEDIATE MEDICAL CARE IF: You have chest pain, difficulty breathy, and/or if you develop a reaction or side effects to medication given.  For Your Safety since you were given sedation/anesthesia and will be taking pain medication:  Someone should stay with you for 24 hours.  Change positions slowly.

## 2024-11-20 LAB
LABORATORY COMMENT REPORT: NORMAL
PATH REPORT.FINAL DX SPEC: NORMAL
PATH REPORT.GROSS SPEC: NORMAL
PATH REPORT.RELEVANT HX SPEC: NORMAL
PATH REPORT.TOTAL CANCER: NORMAL

## 2025-01-31 ENCOUNTER — EVALUATION (OUTPATIENT)
Dept: OCCUPATIONAL THERAPY | Facility: CLINIC | Age: 65
End: 2025-01-31
Payer: MEDICARE

## 2025-01-31 DIAGNOSIS — Z98.890 STATUS POST SURGICAL REMOVAL OF GANGLION CYST: ICD-10-CM

## 2025-01-31 DIAGNOSIS — M67.432 GANGLION CYST OF WRIST, LEFT: ICD-10-CM

## 2025-01-31 DIAGNOSIS — M65.332 TRIGGER FINGER, LEFT MIDDLE FINGER: ICD-10-CM

## 2025-01-31 DIAGNOSIS — Z98.890 S/P TRIGGER FINGER RELEASE: ICD-10-CM

## 2025-01-31 DIAGNOSIS — Z98.890 S/P CARPAL TUNNEL RELEASE: ICD-10-CM

## 2025-01-31 DIAGNOSIS — G56.02 LEFT CARPAL TUNNEL SYNDROME: Primary | ICD-10-CM

## 2025-01-31 PROCEDURE — 97035 APP MDLTY 1+ULTRASOUND EA 15: CPT | Mod: GO

## 2025-01-31 PROCEDURE — 97112 NEUROMUSCULAR REEDUCATION: CPT | Mod: GO

## 2025-01-31 PROCEDURE — 97165 OT EVAL LOW COMPLEX 30 MIN: CPT | Mod: GO

## 2025-01-31 NOTE — PROGRESS NOTES
Occupational Therapy                   Patient Name: Pauly Miguel  MRN: [id number]  Today's Date: 1/31/2025     Surgeon   Dhaval De León MD    Procedure   Decompression Median Nerve with Carpal Tunnel Release. LEFT MIDDLE FINGER TRIGGER RELEASE. LEFT WRIST MASS EXCISION            Diagnosis Information    Diagnosis   Left carpal tunnel syndrome   Trigger finger, left middle finger   Ganglion cyst of wrist, left       Problem #1:  Decreased home exercise program knowledge  Goal #1:  The patient to demonstrate with 100% accuracy exercises to increase strength and decrease pain throughout the left hand.  Treatment Intervention:  Home exercise program education along with range-of-motion activities education.     Problem #2:  Increased Quick DASH score  Goal #2:  The patient to demonstrate an increase in left upper extremity function as indicated by decreased Quick DASH score ranging between 11-15 at the conclusion of all treatment sessions. Current Quick DASH score is 30.      Problem #3: Increased pain level.  Goal #3:  Patient to verbalize a decreased pain level in the left wrist and hand by one to two levels.  Current pain level 3/10.   Treatment intervention: Neuromuscular reeducation, pain decreasing modalities, patient education and coping skill education.     Assessment:   This patient presents with a post surgical onset of this condition and has displayed appropriate coping abilities in dealing with the effects of this injury.  Standardized testing and measures administered today, including Quick Dash, reveal that the patient has minimal impairments in body structures and functions, activity limitations, and participation restrictions.  The Quick Dash was scored at 30.  The patient has a 3 month history of the present problem as is without any personal factors and/or comorbidities that impact the plan of care.  The client was independent prior to the onset of this is impairment.  Two forms of identification were  provided and the client verbalized no complaints during the intake assessment of abuse or neglect.    Areas affected by this include limited muscle strength, decreased task tolerance.  The patient's clinical presentation includes stable & uncomplicated characteristics as noted during today's evaluation, including numbness, pain, decreased motion and strength.   These deficits are effecting the clients home abilities at various times during the day and may be considered as presenting with a condition that is stable & uncomplicated.   Treatment options vary for this client with various treatment protocols available.  Specific physician protocols and activities for a safe return to function will be utilized as available.  Time spent evaluating this client and providing treatment, evaluation and education 55 minutes.  The combination of these findings indicate that this patient has 2 performance deficits and is of low complexity, and skilled OT services are warranted in order to realize measurable change in the above outcome measures and achieve improvements in the patient's functional status and individual goals.  The patient verbalized understanding and is in agreement with all goals and plan of care.        Frequency of care was developed with input and agreement by the patient.     Plan of Care     Frequency and duration: time(s) a week. 1-2 time a week for 8 visits.      Plan of care was developed with input and agreement by the patient.      Client's primary Goal: Return to previous level of function and independence, use left hand again.     Reason For Visit     Initial Evaluation, Evaluation and Treatment.         Client Input     The patient's primary form of communication is English. There are no language barriers. Social interaction is appropriate with good interactive skills noted.     Difficulty With Movement, Self Care, Home Management,  Activity, ADL'S     Past Med/Surgical History: Reviewed  Current  Medications  Please see patient medication and intake questionnaire for current medications.     Adult Risk Screening  There are no spiritual/cultural practices/values/needs that are important to know.  No apparent abuse or neglect is noted, no suicidal ideation or self-harm plans referenced.    Initial Fall Risk Screening:     The client has not fallen in the last 6 months.  The client does not have a fear of falling. The client does not need assistance with sitting, standing or walking.  The client does not use an assistance walking.  The client does not need assistance in an unfamiliar setting. The client is not using an assistive device.      Insurance  Insurance reviewed   Visit number:  1      Treatment     Time in clinic started at: 1515  Time in clinic ended at:  1610  09795 - OT Eval Low Complexity 25 min.  Timed: 51776 Neuromuscular reeducation, 15  Timed: 41085 Ultrasound, 15     Total time in clinic is minutes: 55          Subjective     Patient reports: Pain in the hand. Some numbness at times dependent upon the task.      Objective  Neuro: Reports of left hand sensory changes and wrist/thumb region pain that shoots up the arm.      Strength:     Strength:   Level II lbs. on the right  65  Level II lbs. on the left: 5  Pollard pinch lbs. right: 12  Key pinch lbs. left: 08      composite strength:  Right: WFL  Left: Weak with 50% fisting cascade.     Left Upper Extremity:   Functional Task Tolerance: Completing tasks but symptoms may occur after task is completed. Dropping items due to weakness and lack of motion.        Not Limited Progressing Painful Limited   Carrying      X    Gripping    X   X   Lifting     X  X   Manipulation     X  X   Pinching       X   Pulling       X   Pushing     X  X   Reaching     X  X   Weightbearing     X  X      Treatment Performed Today:  Information communicated to patient.   Education provided included home program   Home program: PROM , tendon gliding , AAROM ,  AROM , modalities available and possible usage, orthosis if needed, strengthening.     Contacts for Physician Signature: 01/31/2025 REFERRAL.    Cristobal Rodas OTR/L, CHT Date: 01/31/2025    1. Left carpal tunnel syndrome        2. S/P carpal tunnel release  Referral to Occupational Therapy      3. S/P trigger finger release  Referral to Occupational Therapy      4. Status post surgical removal of ganglion cyst  Referral to Occupational Therapy      5. Trigger finger, left middle finger        6. Ganglion cyst of wrist, left

## 2025-02-12 ENCOUNTER — TREATMENT (OUTPATIENT)
Dept: OCCUPATIONAL THERAPY | Facility: CLINIC | Age: 65
End: 2025-02-12
Payer: MEDICARE

## 2025-02-12 DIAGNOSIS — M67.432 GANGLION CYST OF WRIST, LEFT: ICD-10-CM

## 2025-02-12 DIAGNOSIS — M65.332 TRIGGER FINGER, LEFT MIDDLE FINGER: ICD-10-CM

## 2025-02-12 DIAGNOSIS — G56.02 LEFT CARPAL TUNNEL SYNDROME: Primary | ICD-10-CM

## 2025-02-12 PROCEDURE — 97035 APP MDLTY 1+ULTRASOUND EA 15: CPT | Mod: GO

## 2025-02-12 PROCEDURE — 97112 NEUROMUSCULAR REEDUCATION: CPT | Mod: GO

## 2025-02-12 NOTE — PROGRESS NOTES
Occupational Therapy                     Patient Name: Pauly Miguel  MRN: [id number]  Today's Date: 2/12/2025     Reason For Visit    Occupational therapy follow visit.      Client Input    Client's primary Goal: Return to previous level of function and independence.     Adult Risk Screening  There are no spiritual/cultural practices/values/needs that are important to know   Initial Fall Risk Screening:   The client has not fallen in the last 6 months. The client has no fall injury. The client does not have a fear of falling. The client does not need assistance with sitting, standing or walking. The client does not need assistance walking in her home. The client does not need assistance in an unfamiliar setting. The patient is not using an assistive device.        Fall Risk: none     Insurance: Reviewed   Visit number: 2    Subjective: I do feel like I can move better since the last session.     The patient's primary form of communication is English. There are no language barriers. Social interaction is appropriate with good interactive skills noted. Difficulty With Movement, Home Management, Work, ADL'S    Objective:     Interventions  Right  Lbs. 26  Left  Lbs.  50  Modalities: Ultrasound to left wrist and left MP regions volar side.   Joint Mobilizations: [L] wrist and digits.  Therapeutic Exercises AROM, PROM, Strengthening, Stretching, Gross Motor Skills.  Patient Education:  Edema control to continue. Composite flexion and opposition exercises to continue. Resistive exercises with egg encouraged for strengthening.   Therapist instructed patient on home exercise program, patient verbalized understanding. Primary form of preferred communication utilized, English.     Assessment:  The client tolerated well.  Motion and strength gains noted with stretching and soft tissue mobilizations. Focused grasp and release tasks to progress for functional abilities and in hand manipulation.   Activity tolerance  increasing for home and daily tasks.  All therapeutic exercises, modalities and activities performed this date to reduce pain, increase hand function, increase strength and independence in ADL and IADL tasks.    Plan:  Continue with occupational therapy progress with motion and strengthening.  Progress towards ADL and work independence along with functional strengthening.    Time in clinic started at: 1350  Time in clinic ended at:  1545  Timed: 27174 Neuromuscular reeducation, 30  73009 Ultrasound, 25  Total time in clinic in minutes: 55   Cristobal Rodas OTR/L, CHT

## 2025-02-21 ENCOUNTER — TREATMENT (OUTPATIENT)
Dept: OCCUPATIONAL THERAPY | Facility: CLINIC | Age: 65
End: 2025-02-21
Payer: MEDICARE

## 2025-02-21 DIAGNOSIS — M65.332 TRIGGER FINGER, LEFT MIDDLE FINGER: ICD-10-CM

## 2025-02-21 DIAGNOSIS — G56.02 LEFT CARPAL TUNNEL SYNDROME: Primary | ICD-10-CM

## 2025-02-21 DIAGNOSIS — M67.432 GANGLION CYST OF WRIST, LEFT: ICD-10-CM

## 2025-02-21 PROCEDURE — 97112 NEUROMUSCULAR REEDUCATION: CPT | Mod: GO

## 2025-02-21 PROCEDURE — 97035 APP MDLTY 1+ULTRASOUND EA 15: CPT | Mod: GO

## 2025-02-25 ENCOUNTER — TREATMENT (OUTPATIENT)
Dept: OCCUPATIONAL THERAPY | Facility: CLINIC | Age: 65
End: 2025-02-25
Payer: MEDICARE

## 2025-02-25 DIAGNOSIS — G56.02 LEFT CARPAL TUNNEL SYNDROME: Primary | ICD-10-CM

## 2025-02-25 DIAGNOSIS — M65.332 TRIGGER FINGER, LEFT MIDDLE FINGER: ICD-10-CM

## 2025-02-25 DIAGNOSIS — M67.432 GANGLION CYST OF WRIST, LEFT: ICD-10-CM

## 2025-02-25 PROCEDURE — 97112 NEUROMUSCULAR REEDUCATION: CPT | Mod: GO

## 2025-02-25 PROCEDURE — 97035 APP MDLTY 1+ULTRASOUND EA 15: CPT | Mod: GO

## 2025-03-17 ENCOUNTER — TREATMENT (OUTPATIENT)
Dept: OCCUPATIONAL THERAPY | Facility: CLINIC | Age: 65
End: 2025-03-17
Payer: MEDICARE

## 2025-03-17 DIAGNOSIS — G56.02 LEFT CARPAL TUNNEL SYNDROME: Primary | ICD-10-CM

## 2025-03-17 DIAGNOSIS — M67.432 GANGLION CYST OF WRIST, LEFT: ICD-10-CM

## 2025-03-17 DIAGNOSIS — M65.332 TRIGGER FINGER, LEFT MIDDLE FINGER: ICD-10-CM

## 2025-03-17 PROCEDURE — 97035 APP MDLTY 1+ULTRASOUND EA 15: CPT | Mod: GO

## 2025-03-17 PROCEDURE — 97112 NEUROMUSCULAR REEDUCATION: CPT | Mod: GO

## 2025-03-27 ENCOUNTER — TREATMENT (OUTPATIENT)
Dept: OCCUPATIONAL THERAPY | Facility: CLINIC | Age: 65
End: 2025-03-27
Payer: MEDICARE

## 2025-03-27 DIAGNOSIS — M65.332 TRIGGER FINGER, LEFT MIDDLE FINGER: ICD-10-CM

## 2025-03-27 DIAGNOSIS — M67.432 GANGLION CYST OF WRIST, LEFT: ICD-10-CM

## 2025-03-27 DIAGNOSIS — G56.02 LEFT CARPAL TUNNEL SYNDROME: Primary | ICD-10-CM

## 2025-03-27 PROCEDURE — 97112 NEUROMUSCULAR REEDUCATION: CPT | Mod: GO

## 2025-03-27 PROCEDURE — 97035 APP MDLTY 1+ULTRASOUND EA 15: CPT | Mod: GO

## 2025-04-01 ENCOUNTER — TREATMENT (OUTPATIENT)
Dept: OCCUPATIONAL THERAPY | Facility: CLINIC | Age: 65
End: 2025-04-01
Payer: MEDICARE

## 2025-04-01 DIAGNOSIS — M65.332 TRIGGER FINGER, LEFT MIDDLE FINGER: ICD-10-CM

## 2025-04-01 DIAGNOSIS — M67.432 GANGLION CYST OF WRIST, LEFT: ICD-10-CM

## 2025-04-01 DIAGNOSIS — G56.02 LEFT CARPAL TUNNEL SYNDROME: Primary | ICD-10-CM

## 2025-04-01 PROCEDURE — 97035 APP MDLTY 1+ULTRASOUND EA 15: CPT | Mod: GO

## 2025-04-01 PROCEDURE — 97112 NEUROMUSCULAR REEDUCATION: CPT | Mod: GO
